# Patient Record
Sex: FEMALE | Race: WHITE | NOT HISPANIC OR LATINO | URBAN - METROPOLITAN AREA
[De-identification: names, ages, dates, MRNs, and addresses within clinical notes are randomized per-mention and may not be internally consistent; named-entity substitution may affect disease eponyms.]

---

## 2017-07-31 ENCOUNTER — AMBULATORY - HEALTHEAST (OUTPATIENT)
Dept: ADMINISTRATIVE | Facility: CLINIC | Age: 82
End: 2017-07-31

## 2017-07-31 RX ORDER — FUROSEMIDE 20 MG
20 TABLET ORAL DAILY
Status: SHIPPED | COMMUNITY
Start: 2017-07-31

## 2017-07-31 RX ORDER — ACETAMINOPHEN 500 MG
1000 TABLET ORAL 3 TIMES DAILY
Status: SHIPPED | COMMUNITY
Start: 2017-07-31

## 2017-07-31 RX ORDER — METOPROLOL SUCCINATE 25 MG/1
50 TABLET, EXTENDED RELEASE ORAL DAILY
Status: SHIPPED | COMMUNITY
Start: 2017-07-31

## 2017-07-31 RX ORDER — AMOXICILLIN 250 MG
1 CAPSULE ORAL 2 TIMES DAILY
Status: SHIPPED | COMMUNITY
Start: 2017-07-31

## 2017-07-31 RX ORDER — HYDRALAZINE HYDROCHLORIDE 25 MG/1
25 TABLET, FILM COATED ORAL 2 TIMES DAILY
Status: SHIPPED | COMMUNITY
Start: 2017-07-31

## 2017-07-31 RX ORDER — ASPIRIN 325 MG
325 TABLET ORAL DAILY
Status: SHIPPED | COMMUNITY
Start: 2017-07-31

## 2017-07-31 RX ORDER — ISOSORBIDE MONONITRATE 30 MG/1
30 TABLET, EXTENDED RELEASE ORAL DAILY
Status: SHIPPED | COMMUNITY
Start: 2017-07-31

## 2017-07-31 RX ORDER — LANSOPRAZOLE 30 MG/1
30 CAPSULE, DELAYED RELEASE ORAL
Status: SHIPPED | COMMUNITY
Start: 2017-07-31

## 2017-07-31 RX ORDER — NITROGLYCERIN 0.4 MG/1
0.4 TABLET SUBLINGUAL EVERY 5 MIN PRN
Status: SHIPPED | COMMUNITY
Start: 2017-07-31

## 2017-08-01 ENCOUNTER — OFFICE VISIT - HEALTHEAST (OUTPATIENT)
Dept: GERIATRICS | Facility: CLINIC | Age: 82
End: 2017-08-01

## 2017-08-01 DIAGNOSIS — R33.9 URINARY RETENTION: ICD-10-CM

## 2017-08-01 DIAGNOSIS — I48.91 ATRIAL FIBRILLATION (H): ICD-10-CM

## 2017-08-01 DIAGNOSIS — I50.30 DIASTOLIC CONGESTIVE HEART FAILURE (H): ICD-10-CM

## 2017-08-01 DIAGNOSIS — I63.9 ACUTE CVA (CEREBROVASCULAR ACCIDENT) (H): ICD-10-CM

## 2017-08-01 DIAGNOSIS — F03.90 DEMENTIA (H): ICD-10-CM

## 2017-08-01 DIAGNOSIS — I60.9 SUBARACHNOID HEMORRHAGE (H): ICD-10-CM

## 2017-08-04 ENCOUNTER — OFFICE VISIT - HEALTHEAST (OUTPATIENT)
Dept: GERIATRICS | Facility: CLINIC | Age: 82
End: 2017-08-04

## 2017-08-04 DIAGNOSIS — I25.10 CAD (CORONARY ARTERY DISEASE): ICD-10-CM

## 2017-08-04 DIAGNOSIS — I48.91 AFIB (H): ICD-10-CM

## 2017-08-04 DIAGNOSIS — I63.9 CVA (CEREBRAL VASCULAR ACCIDENT) (H): ICD-10-CM

## 2017-08-04 DIAGNOSIS — K59.00 CONSTIPATION: ICD-10-CM

## 2017-08-04 DIAGNOSIS — G81.94 LEFT HEMIPARESIS (H): ICD-10-CM

## 2017-08-04 DIAGNOSIS — I10 HYPERTENSION: ICD-10-CM

## 2017-08-04 DIAGNOSIS — R13.10 DYSPHAGIA: ICD-10-CM

## 2017-08-04 DIAGNOSIS — R47.1 DYSARTHRIA: ICD-10-CM

## 2017-08-04 DIAGNOSIS — K21.9 GERD (GASTROESOPHAGEAL REFLUX DISEASE): ICD-10-CM

## 2017-08-04 DIAGNOSIS — F03.90 DEMENTIA (H): ICD-10-CM

## 2017-08-04 DIAGNOSIS — I50.9 CHF (CONGESTIVE HEART FAILURE) (H): ICD-10-CM

## 2017-08-04 DIAGNOSIS — R33.9 URINARY RETENTION: ICD-10-CM

## 2017-08-10 ENCOUNTER — AMBULATORY - HEALTHEAST (OUTPATIENT)
Dept: ADMINISTRATIVE | Facility: CLINIC | Age: 82
End: 2017-08-10

## 2017-08-11 ENCOUNTER — OFFICE VISIT - HEALTHEAST (OUTPATIENT)
Dept: GERIATRICS | Facility: CLINIC | Age: 82
End: 2017-08-11

## 2017-08-11 DIAGNOSIS — R33.9 URINARY RETENTION: ICD-10-CM

## 2017-08-11 DIAGNOSIS — I60.9 SAH (SUBARACHNOID HEMORRHAGE) (H): ICD-10-CM

## 2017-08-11 DIAGNOSIS — I74.2 EMBOLISM AND THROMBOSIS OF ARTERY OF UPPER EXTREMITY (H): ICD-10-CM

## 2017-08-11 DIAGNOSIS — I10 HYPERTENSION: ICD-10-CM

## 2017-08-11 DIAGNOSIS — I48.20 CHRONIC ATRIAL FIBRILLATION (H): ICD-10-CM

## 2017-08-11 DIAGNOSIS — I63.9 CVA (CEREBRAL VASCULAR ACCIDENT) (H): ICD-10-CM
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
## 2017-08-11 DIAGNOSIS — K22.2 ESOPHAGEAL STRICTURE: ICD-10-CM

## 2017-08-18 ENCOUNTER — OFFICE VISIT - HEALTHEAST (OUTPATIENT)
Dept: GERIATRICS | Facility: CLINIC | Age: 82
End: 2017-08-18

## 2017-08-18 DIAGNOSIS — I70.208 BRACHIAL ARTERY OCCLUSION, RIGHT (H): ICD-10-CM

## 2017-08-18 DIAGNOSIS — Z98.890 HISTORY OF EMBOLECTOMY: ICD-10-CM

## 2017-08-18 DIAGNOSIS — I10 HYPERTENSION: ICD-10-CM

## 2017-08-18 DIAGNOSIS — R13.10 DYSPHAGIA: ICD-10-CM

## 2017-08-18 DIAGNOSIS — R52 PAIN: ICD-10-CM

## 2017-08-18 DIAGNOSIS — I50.9 CHF (CONGESTIVE HEART FAILURE) (H): ICD-10-CM

## 2017-08-18 DIAGNOSIS — R33.9 URINARY RETENTION: ICD-10-CM

## 2017-08-18 DIAGNOSIS — I63.9 CVA (CEREBRAL VASCULAR ACCIDENT) (H): ICD-10-CM

## 2017-08-22 ENCOUNTER — OFFICE VISIT - HEALTHEAST (OUTPATIENT)
Dept: GERIATRICS | Facility: CLINIC | Age: 82
End: 2017-08-22

## 2017-08-22 DIAGNOSIS — G81.94 LEFT HEMIPARESIS (H): ICD-10-CM

## 2017-08-22 DIAGNOSIS — I10 HYPERTENSION: ICD-10-CM

## 2017-08-22 DIAGNOSIS — M1A.9XX1 GOUTY TOPHI OF JOINT: ICD-10-CM

## 2017-08-22 DIAGNOSIS — I70.208 BRACHIAL ARTERY OCCLUSION, RIGHT (H): ICD-10-CM

## 2017-08-24 ENCOUNTER — AMBULATORY - HEALTHEAST (OUTPATIENT)
Dept: ADMINISTRATIVE | Facility: CLINIC | Age: 82
End: 2017-08-24

## 2017-08-24 RX ORDER — ALLOPURINOL 100 MG/1
100 TABLET ORAL DAILY
Status: SHIPPED | COMMUNITY
Start: 2017-08-24

## 2017-08-24 RX ORDER — LIDOCAINE 50 MG/G
1 OINTMENT TOPICAL 3 TIMES DAILY PRN
Status: SHIPPED | COMMUNITY
Start: 2017-08-24

## 2017-08-25 ENCOUNTER — OFFICE VISIT - HEALTHEAST (OUTPATIENT)
Dept: GERIATRICS | Facility: CLINIC | Age: 82
End: 2017-08-25

## 2017-08-25 DIAGNOSIS — G81.94 LEFT HEMIPARESIS (H): ICD-10-CM

## 2017-08-25 DIAGNOSIS — I63.9 CVA (CEREBRAL VASCULAR ACCIDENT) (H): ICD-10-CM

## 2017-08-25 DIAGNOSIS — I10 HYPERTENSION: ICD-10-CM

## 2017-08-25 DIAGNOSIS — M1A.9XX1 GOUTY TOPHI OF JOINT: ICD-10-CM

## 2017-08-29 ENCOUNTER — OFFICE VISIT - HEALTHEAST (OUTPATIENT)
Dept: GERIATRICS | Facility: CLINIC | Age: 82
End: 2017-08-29

## 2017-08-29 DIAGNOSIS — I63.9 CVA (CEREBRAL VASCULAR ACCIDENT) (H): ICD-10-CM

## 2017-08-29 DIAGNOSIS — R13.10 DYSPHAGIA: ICD-10-CM

## 2017-08-29 DIAGNOSIS — I70.208 BRACHIAL ARTERY OCCLUSION, RIGHT (H): ICD-10-CM

## 2017-08-29 DIAGNOSIS — G81.94 LEFT HEMIPARESIS (H): ICD-10-CM

## 2017-09-08 ENCOUNTER — OFFICE VISIT - HEALTHEAST (OUTPATIENT)
Dept: GERIATRICS | Facility: CLINIC | Age: 82
End: 2017-09-08

## 2017-09-08 DIAGNOSIS — I10 HYPERTENSION: ICD-10-CM

## 2017-09-08 DIAGNOSIS — R13.10 DYSPHAGIA: ICD-10-CM

## 2017-09-08 DIAGNOSIS — R33.9 URINARY RETENTION: ICD-10-CM

## 2017-09-08 DIAGNOSIS — M70.60 TROCHANTERIC BURSITIS: ICD-10-CM

## 2017-09-12 ENCOUNTER — OFFICE VISIT - HEALTHEAST (OUTPATIENT)
Dept: GERIATRICS | Facility: CLINIC | Age: 82
End: 2017-09-12

## 2017-09-12 DIAGNOSIS — R33.9 URINARY RETENTION: ICD-10-CM

## 2017-09-12 DIAGNOSIS — M25.559 HIP PAIN: ICD-10-CM

## 2017-09-12 DIAGNOSIS — G81.94 LEFT HEMIPARESIS (H): ICD-10-CM

## 2017-09-12 DIAGNOSIS — M70.60 TROCHANTERIC BURSITIS: ICD-10-CM

## 2017-09-15 ENCOUNTER — OFFICE VISIT - HEALTHEAST (OUTPATIENT)
Dept: GERIATRICS | Facility: CLINIC | Age: 82
End: 2017-09-15

## 2017-09-15 DIAGNOSIS — I50.9 CHF (CONGESTIVE HEART FAILURE) (H): ICD-10-CM

## 2017-09-15 DIAGNOSIS — M1A.9XX1 GOUTY TOPHI OF JOINT: ICD-10-CM

## 2017-09-15 DIAGNOSIS — M70.60 TROCHANTERIC BURSITIS: ICD-10-CM

## 2017-09-15 DIAGNOSIS — R33.9 URINARY RETENTION: ICD-10-CM

## 2017-09-19 ENCOUNTER — OFFICE VISIT - HEALTHEAST (OUTPATIENT)
Dept: GERIATRICS | Facility: CLINIC | Age: 82
End: 2017-09-19

## 2017-09-19 DIAGNOSIS — I10 HYPERTENSION: ICD-10-CM

## 2017-09-19 DIAGNOSIS — M70.60 TROCHANTERIC BURSITIS: ICD-10-CM

## 2017-09-19 DIAGNOSIS — G81.94 LEFT HEMIPARESIS (H): ICD-10-CM

## 2017-09-19 DIAGNOSIS — R33.9 URINARY RETENTION: ICD-10-CM

## 2017-09-23 ENCOUNTER — OFFICE VISIT - HEALTHEAST (OUTPATIENT)
Dept: GERIATRICS | Facility: CLINIC | Age: 82
End: 2017-09-23

## 2017-09-23 DIAGNOSIS — I63.9 CVA (CEREBRAL VASCULAR ACCIDENT) (H): ICD-10-CM

## 2017-09-23 DIAGNOSIS — I50.9 CHF (CONGESTIVE HEART FAILURE) (H): ICD-10-CM

## 2017-09-23 DIAGNOSIS — R47.1 DYSARTHRIA: ICD-10-CM

## 2017-09-23 DIAGNOSIS — G81.94 LEFT HEMIPARESIS (H): ICD-10-CM

## 2017-09-26 ENCOUNTER — OFFICE VISIT - HEALTHEAST (OUTPATIENT)
Dept: GERIATRICS | Facility: CLINIC | Age: 82
End: 2017-09-26

## 2017-09-26 DIAGNOSIS — I10 HYPERTENSION: ICD-10-CM

## 2017-09-26 DIAGNOSIS — I63.9 CVA (CEREBRAL VASCULAR ACCIDENT) (H): ICD-10-CM

## 2017-09-26 DIAGNOSIS — I50.9 CHF (CONGESTIVE HEART FAILURE) (H): ICD-10-CM

## 2017-09-26 DIAGNOSIS — K21.9 GERD (GASTROESOPHAGEAL REFLUX DISEASE): ICD-10-CM

## 2017-10-02 ENCOUNTER — AMBULATORY - HEALTHEAST (OUTPATIENT)
Dept: GERIATRICS | Facility: CLINIC | Age: 82
End: 2017-10-02

## 2021-06-12 NOTE — PROGRESS NOTES
[facility]      Facility:    [facility] [[id number]]  Code Status: UNKNOWN      Chief Complaint/Reason for Visit:  Chief Complaint   Patient presents with     H & P     Acute right occipital CVA, recent subarachnoid hemorrhage on Coumadin, coronary artery disease, hypertension, chronic diastolic congestive heart failure, chronic atrial fibrillation, dementia, urinary retention, constipation, leukocytosis.       HPI:   Lien is a 86 y.o. female who who was recently admitted to the hospital on 7/24/2017.  She does have a history of atrial fibrillation coronary artery disease and did have a recent subarachnoid hemorrhage after a fall while she was on Coumadin.  At that time Coumadin was held and she was home and doing well.  She went to rehab then developed left visual field cut deficit and dysarthria and found to have acute CVA.  She was found to have acute CVA right occipital and neurologic involvement was done.  Echocardiogram showed atrial fibrillation with normal ejection fraction and she had severe biatrial enlargement.  She was started on aspirin but not on Coumadin and she underwent PT OT and speech therapy.  She was also seen by physical medicine and rehab indicated patient was not a good candidate for rehab and was sent to the TCU here at [facility].  She does have acute diastolic congestive heart failure with increased shortness of breath and concern for fluid overload.  Chest x-ray for was intermediate and she remained on oxygen.  They did stop her fluids and increase Lasix back to 40 mg and she does have chronic atrial fibrillation and not on Coumadin.  They did mention to consider possibly starting DOA C this week and patient was transferred here to the TCU in stable condition.  She did have urinary retention and she does have a Barkley catheter and in which was continued and she does have dysphagia which she is going to be followed by speech therapy.  Her  leukocytosis in the hospital did resolve.    Patient claims to be short of breath she is not improved since the hospital and her O2 sats are 98% on 2 L with respiratory rate of 20.  Her blood pressure is stable and she is afebrile.  She has not had a bowel movement in about 3 days and feels like she has abdominal pain that is worsening.  It is very difficult with her speaking secondary to her shortness of breath and she has had a history of subarachnoid hemorrhage as well as right occipital stroke.    Past Medical History:  Past Medical History:   Diagnosis Date     Acute CVA (cerebrovascular accident)      CAD (coronary artery disease)      Chronic a-fib      Chronic diastolic CHF (congestive heart failure)      Dementia      Dysphagia      Emphysema, unspecified      HTN (hypertension)      Leukocytosis      Post-polio syndrome      SAH (subarachnoid hemorrhage)      Urinary retention            Surgical History:  History reviewed. No pertinent surgical history.    Family History:   Family History   Problem Relation Age of Onset     Cardiovascular Mother      Cardiovascular Father        Social History:    Social History     Social History     Marital status: N/A     Spouse name: N/A     Number of children: N/A     Years of education: N/A     Social History Main Topics     Smoking status: Current Some Day Smoker     Types: Cigarettes     Smokeless tobacco: None     Alcohol use 0.6 oz/week     1 Glasses of wine per week     Drug use: None     Sexual activity: Not Asked     Other Topics Concern     None     Social History Narrative          Review of Systems   Constitutional:        Positive for shortness of breath, abdominal pain with no bowel movement in 4 days, patient feels overall in decline and frustration with no improvement.  She denies any fevers chills vomiting she does have some occasional nausea and there is no chest pain or tightness.  She can move all 4 extremities however does not feel she is  improving.       Vitals:    08/01/17 0850   BP: 142/64   Pulse: 72   Resp: 20   Temp: 97.2  F (36.2  C)   SpO2: 98%       Physical Exam   Constitutional:   Patient is sitting up in chair.  She is speaking very softly with respiratory rate of 20 with O2 sat of 98% on 2 L.  She seems like she cannot take a deep breath at this time and does have difficulty talking.   HENT:   Head: Normocephalic.   Eyes: Right eye exhibits no discharge. Left eye exhibits no discharge. No scleral icterus.   Neck: No thyromegaly present.   Cardiovascular:   Patient's heart sounds are irregularly irregular with adequate rate control.   Pulmonary/Chest:   Patient exhibits poor inspiratory volume with some crackles at the bases bilateral.  Diminished breath sounds are noted bilateral.   Abdominal: She exhibits distension. There is tenderness. There is no rebound and no guarding.   Musculoskeletal: She exhibits edema.   Neurological: She is alert. She exhibits abnormal muscle tone.   Generalized weakness throughout.  Left visual cut noted.   Skin: Skin is warm and dry.   Psychiatric: She has a normal mood and affect. Her behavior is normal.       Medication List:  Current Outpatient Prescriptions   Medication Sig     acetaminophen (TYLENOL) 500 MG tablet Take 1,000 mg by mouth 3 (three) times a day.     aspirin 325 MG tablet Take 325 mg by mouth daily.     calcium carbonate-vitamin D3 (CALTRATE 600 PLUS D3) 600 mg(1,500mg) -400 unit per tablet Take 1 tablet by mouth daily.     cholecalciferol, vitamin D3, 1,000 unit tablet Take 1,000 Units by mouth daily.     docusate sodium (COLACE) 100 MG capsule Take 100 mg by mouth 2 (two) times a day as needed for constipation.     famotidine (PEPCID) 20 MG tablet Take 40 mg by mouth at bedtime as needed for heartburn.     furosemide (LASIX) 20 MG tablet Take 40 mg by mouth daily.     hydrALAZINE (APRESOLINE) 25 MG tablet Take 25 mg by mouth 3 (three) times a day.     isosorbide mononitrate (IMDUR) 30 MG  24 hr tablet Take 30 mg by mouth daily.     lansoprazole (PREVACID) 30 MG capsule Take 30 mg by mouth 2 (two) times a day before meals.     menthol (BIOFREEZE, MENTHOL,) 4 % Gel Apply 1 application topically 2 (two) times a day as needed.     metoprolol succinate (TOPROL-XL) 25 MG Take 25 mg by mouth daily.     nitroglycerin (NITROSTAT) 0.4 MG SL tablet Place 0.4 mg under the tongue every 5 (five) minutes as needed for chest pain.     oxyCODONE (ROXICODONE) 5 MG immediate release tablet Take 5 mg by mouth every 6 (six) hours as needed for pain.     polyethylene glycol (MIRALAX) 17 gram packet Take 17 g by mouth daily.     senna-docusate (SENNOSIDES-DOCUSATE SODIUM) 8.6-50 mg tablet Take 1 tablet by mouth 2 (two) times a day.     senna-docusate (SENNOSIDES-DOCUSATE SODIUM) 8.6-50 mg tablet Take 1 tablet by mouth 2 (two) times a day as needed for constipation.     simethicone (MYLICON) 125 MG chewable tablet Chew 125 mg daily.     simvastatin (ZOCOR) 40 MG tablet Take 40 mg by mouth at bedtime.       Labs: EKG in the hospital showed rate of 67 with no ST/T changes.  CTA of the neck showed no significant internal carotid artery stenosis largely occluded left vertebral artery and normal right vertebral artery.  Head CT stable moderate generalized atrophy and moderate to severe presumed chronic small vessel ischemic changes.      Assessment:    ICD-10-CM    1. Acute CVA (cerebrovascular accident) I63.9    2. Subarachnoid hemorrhage I60.9    3. Diastolic congestive heart failure I50.30    4. Dementia F03.90    5. Atrial fibrillation I48.91    6. Urinary retention R33.9        Plan: Plan at this time is to get a chest x-ray PA and lateral stat today secondary to shortness of breath and continue to monitor her respiratory status closely.  Speech therapy will evaluate and treat and abdominal film will be done flat and upright stat for constipation.  Dulcolax suppository today stat and a basic metabolic profile and CBC be  done stat today secondary to shortness of breath.  I cannot rule out pneumonia at this time so oxygen will be given to keep sats greater than 90.  I will continue to monitor above medical problems and no other changes to care plan at this time.  Only catheter will remain in at this time and patient will possibly go back to the hospital if no improvement is done.        Electronically signed by: Dedrick Rodrigues,

## 2021-06-12 NOTE — PROGRESS NOTES
[facility]    Facility:   [facility] [[id number]]   Code Status: DNR      CHIEF COMPLAINT/REASON FOR VISIT:  Chief Complaint   Patient presents with     Follow Up     cva, 02,        HISTORY:      HPI: Lien is a 86 y.o. female who I was able to visit with today secondary to hospitalization July 24 - July 29, 2017 secondary to her recent subarachnoid hemorrhage after a fall while being on Coumadin.  She went to cardiovascular for further rehab and developed left visual field cut deficit and dysarthria was found to have acute CVA/occipital.  Neurology was involved.  Echo with A. fib normal ejection fraction.  Does have severe biatrial enlargement.  Aspirin was started.  Therapies were also initiated.  The recent subarachnoid hemorrhage was stable and monitored.  Does have a history of CAD and hypertension currently being treated with metoprolol and Imdur along with a statin.  Has a history of chronic congestive diastolic failure does have shortness of breath the chest x-ray was indeterminate.  Currently on furosemide.  Also currently on oxygen.  Does have an A. fib is chronic not on Coumadin due to subarachnoid hemorrhage.  She does have urinary retention does have a Barkley catheter.  Does have dysphagia along with a poor appetite.  Was seen by gastroenterology and has had a esophageal stricture dilated and gastritis.  Also being treated for constipation along with a resolved leukocytosis.  Had a chance to address all these particular concerns.  Recently did have a abdominal x-ray which did show constipation also recently a chest x-ray which was negative.  She did have laboratory studies including a BMP and hemoglobin on August 1.  For pain she is on Tylenol thousand milligrams 3 times a day oxycodone as needed 5 mg is only needed 1 dose.  Needs a full assist with staff.  For heartburn or reflux currently on Prevacid.  Does have left-sided weakness.    Past Medical History:    Diagnosis Date     Acute CVA (cerebrovascular accident)      CAD (coronary artery disease)      Chronic a-fib      Chronic diastolic CHF (congestive heart failure)      Dementia      Dysphagia      Emphysema, unspecified      HTN (hypertension)      Leukocytosis      Post-polio syndrome      SAH (subarachnoid hemorrhage)      Urinary retention              Family History   Problem Relation Age of Onset     Cardiovascular Mother      Cardiovascular Father      Social History     Social History     Marital status:      Spouse name: N/A     Number of children: N/A     Years of education: N/A     Social History Main Topics     Smoking status: Current Some Day Smoker     Types: Cigarettes     Smokeless tobacco: Not on file     Alcohol use 0.6 oz/week     1 Glasses of wine per week     Drug use: Not on file     Sexual activity: Not on file     Other Topics Concern     Not on file     Social History Narrative         Review of Systems  Currently no reports of fever chills fatigue cough or cold flulike symptoms rashes sores or stiff neck.  Recently did have an acute CVA with right occipital does have dysarthria and dysphagia.  A history of CAD CHF recent subarachnoid hemorrhage A. fib hypertension urinary retention    Current Outpatient Prescriptions   Medication Sig     acetaminophen (TYLENOL) 500 MG tablet Take 1,000 mg by mouth 3 (three) times a day.     aspirin 325 MG tablet Take 325 mg by mouth daily.     calcium carbonate-vitamin D3 (CALTRATE 600 PLUS D3) 600 mg(1,500mg) -400 unit per tablet Take 1 tablet by mouth daily.     cholecalciferol, vitamin D3, 1,000 unit tablet Take 1,000 Units by mouth daily.     docusate sodium (COLACE) 100 MG capsule Take 100 mg by mouth 2 (two) times a day as needed for constipation.     famotidine (PEPCID) 20 MG tablet Take 40 mg by mouth at bedtime as needed for heartburn.     furosemide (LASIX) 20 MG tablet Take 40 mg by mouth daily.     hydrALAZINE (APRESOLINE) 25 MG tablet  Take 25 mg by mouth 3 (three) times a day.     isosorbide mononitrate (IMDUR) 30 MG 24 hr tablet Take 30 mg by mouth daily.     lansoprazole (PREVACID) 30 MG capsule Take 30 mg by mouth 2 (two) times a day before meals.     menthol (BIOFREEZE, MENTHOL,) 4 % Gel Apply 1 application topically 2 (two) times a day as needed.     metoprolol succinate (TOPROL-XL) 25 MG Take 25 mg by mouth daily.     nitroglycerin (NITROSTAT) 0.4 MG SL tablet Place 0.4 mg under the tongue every 5 (five) minutes as needed for chest pain.     oxyCODONE (ROXICODONE) 5 MG immediate release tablet Take 5 mg by mouth every 6 (six) hours as needed for pain.     polyethylene glycol (MIRALAX) 17 gram packet Take 17 g by mouth daily.     senna-docusate (SENNOSIDES-DOCUSATE SODIUM) 8.6-50 mg tablet Take 1 tablet by mouth 2 (two) times a day.     senna-docusate (SENNOSIDES-DOCUSATE SODIUM) 8.6-50 mg tablet Take 1 tablet by mouth 2 (two) times a day as needed for constipation.     simethicone (MYLICON) 125 MG chewable tablet Chew 125 mg daily.     simvastatin (ZOCOR) 40 MG tablet Take 40 mg by mouth at bedtime.       .There were no vitals filed for this visit.  Blood pressure 100/61 pulse 66 respirations 18 temperature 97.0  Physical Exam   Constitutional: No distress.   HENT:   Head: Normocephalic.   Eyes: Pupils are equal, round, and reactive to light.   Neck: Neck supple. No thyromegaly present.   Cardiovascular:   Irregularity.  No lower extremity edema.   Pulmonary/Chest: Breath sounds normal.   Dysphagia.   Abdominal: Bowel sounds are normal. There is no tenderness. There is no guarding.   Genitourinary:   Genitourinary Comments: Urinary retention.  Barkley catheter.   Musculoskeletal:   Left hemiparesis.  History of CVA right/occipital.   Lymphadenopathy:     She has no cervical adenopathy.   Neurological: She is alert.   Dysarthria.   Skin: Skin is warm and dry. No rash noted.         LABS:   Lab Results   Component Value Date    WBC 6.7  08/01/2017    HGB 12.0 08/01/2017    HCT 38.5 08/01/2017    MCV 96 08/01/2017     08/01/2017     Results for orders placed or performed in visit on 08/01/17   Basic Metabolic Panel   Result Value Ref Range    Sodium 143 136 - 145 mmol/L    Potassium 3.8 3.5 - 5.0 mmol/L    Chloride 98 98 - 107 mmol/L    CO2 34 (H) 22 - 31 mmol/L    Anion Gap, Calculation 11 5 - 18 mmol/L    Glucose 112 70 - 125 mg/dL    Calcium 10.0 8.5 - 10.5 mg/dL    BUN 11 8 - 28 mg/dL    Creatinine 0.76 0.60 - 1.10 mg/dL    GFR MDRD Af Amer >60 >60 mL/min/1.73m2    GFR MDRD Non Af Amer >60 >60 mL/min/1.73m2           ASSESSMENT:      ICD-10-CM    1. CVA (cerebral vascular accident) I63.9    2. Dysphagia R13.10    3. Dysarthria R47.1    4. Urinary retention R33.9    5. Constipation K59.00    6. Afib I48.91    7. CAD (coronary artery disease) I25.10    8. Dementia F03.90    9. GERD (gastroesophageal reflux disease) K21.9    10. Hypertension I10    11. Left hemiparesis G81.94    12. CHF (congestive heart failure) I50.9        PLAN:    Went over her recent laboratory studies.  Went over her hospitalization.  Continue to manage and follow.  Again recently a chest x-ray which was negative.  Also did have a belly x-ray which did show constipation.  Is still on oxygen.  Barkley catheter.  Her pain seems to be well managed.  Does need assistance with all ADLs.        Electronically signed by: Michael Duane Johnson, CNP

## 2021-06-12 NOTE — PROGRESS NOTES
[facility]      Facility:    [facility]SW[facility] ROOMING ACTIVITY QUESTION    Code Status: DNR      Chief Complaint/Reason for Visit:  Chief Complaint   Patient presents with     H & P     readmisssion after ight arm declotted       HPI:   Lien is a 86 y.o. female who originally was hospitalized July 4 - July 29, 2017 when she had fallen, and suffered a small subarachnoid hemorrhage while on Coumadin therapy.  While there, she developed a left visual field cut deficit, dysarthria, having suffered an acute occipital CVA.  She had biatrial enlargement which was severe, was started on aspirin.  She has a history of coronary artery disease and hypertension treated with metoprolol and Imdur and statin.  She has known history of chronic congestive diastolic failure  She has chronic A. fib no longer on Coumadin after the subarachnoid hemorrhage  She had dilatation esophageal stricture as well as gastritis.  Transferred to [facility] 3 after her stroke for rehabilitation    While at the TCU, she developed acute right upper extremity numbness and paralysis on 8/6/17.  She returned to [facility], was found to have a right brachial artery occlusive embolus, thought to be from left atrial thrombus.  She went to the OR for embolectomy that same day.  She had significant postoperative pain, did not want to take narcotics.  The patient had some difficulty with swallowing but speech language path did a swallow study, she was okayed for regular diet, thin liquids.  She was discharged from [facility] and returned to [facility]    Past Medical History:  Past Medical History:   Diagnosis Date     Acute CVA (cerebrovascular accident)      Arterial occlusion      Artery of extremity, embolism and thrombosis      CAD (coronary artery disease)      Chronic a-fib      Chronic diastolic CHF (congestive heart failure)      Dementia      Dysphagia      Emphysema, unspecified      HTN (hypertension)       Leukocytosis      Post-polio syndrome      SAH (subarachnoid hemorrhage)      Urinary retention            Surgical History:  Past Surgical History:   Procedure Laterality Date     BRACHIAL EMBOLECTOMY Right 08/2006    right upper extremity embolectomy        Family History:   Family History   Problem Relation Age of Onset     Cardiovascular Mother      Cardiovascular Father        Social History:    Social History     Social History     Marital status:      Spouse name: N/A     Number of children: N/A     Years of education: N/A     Social History Main Topics     Smoking status: Current Some Day Smoker     Types: Cigarettes     Smokeless tobacco: Not on file     Alcohol use 0.6 oz/week     1 Glasses of wine per week     Drug use: Not on file     Sexual activity: Not on file     Other Topics Concern     Not on file     Social History Narrative          Review of Systems   She is taking Norco 5/325 mg for her arm it is helpful.  Her arm where this surgical incision is is painful for her.  She has had a lot of hiccups recently  She has some dizziness that comes and go, but it is not vertigo.  She has mild constipation but does not need a change in medications at this point.  She lives in assisted living, and gets quite a few services.  The remainder of the comprehensive review of systems is negative    Blood pressure 100/50, pulse 96, temperature (!) 96.5  F (35.8  C), resp. rate 16, SpO2 97 %.        Physical Exam   Constitutional: She is oriented to person, place, and time. She appears distressed.   HENT:   Right Ear: External ear normal.   Left Ear: External ear normal.   Nose: Nose normal.   Mouth/Throat: Oropharynx is clear and moist.   Eyes: Conjunctivae and EOM are normal. No scleral icterus.   Cardiovascular: Normal heart sounds.    Irregularly irregular   Pulmonary/Chest: Breath sounds normal. She has no rales.   Abdominal: Soft. Bowel sounds are normal. She exhibits no distension. There is no  tenderness.   Lymphadenopathy:     She has no cervical adenopathy.   Neurological: She is alert and oriented to person, place, and time.   Left arm with low muscle bulk, decreased range of motion   Skin: No erythema.   Left arm a bit weak (congenital)  Right upper arm incision has staples and Steri-Strips, with some bloody discharge.  She has a full radial pulse   Psychiatric: She has a normal mood and affect. Her behavior is normal. Thought content normal.       Medication List:  Current Outpatient Prescriptions   Medication Sig     acetaminophen (TYLENOL) 500 MG tablet Take 1,000 mg by mouth 3 (three) times a day.     aspirin 325 MG tablet Take 325 mg by mouth daily.     calcium carbonate-vitamin D3 (CALTRATE 600 PLUS D3) 600 mg(1,500mg) -400 unit per tablet Take 1 tablet by mouth daily.     cholecalciferol, vitamin D3, 1,000 unit tablet Take 1,000 Units by mouth daily.     docusate sodium (COLACE) 100 MG capsule Take 100 mg by mouth 2 (two) times a day as needed for constipation.     enoxaparin (LOVENOX) 40 mg/0.4 mL syringe Inject 40 mg under the skin every 12 (twelve) hours.     famotidine (PEPCID) 20 MG tablet Take 40 mg by mouth at bedtime as needed for heartburn.     furosemide (LASIX) 20 MG tablet Take 40 mg by mouth daily.     hydrALAZINE (APRESOLINE) 25 MG tablet Take 25 mg by mouth 3 (three) times a day.     HYDROcodone-acetaminophen 5-325 mg per tablet Take 1-2 tablets by mouth every 6 (six) hours as needed for pain.     isosorbide mononitrate (IMDUR) 30 MG 24 hr tablet Take 30 mg by mouth daily.     lansoprazole (PREVACID) 30 MG capsule Take 30 mg by mouth 2 (two) times a day before meals.     menthol (BIOFREEZE, MENTHOL,) 4 % Gel Apply 1 application topically 2 (two) times a day as needed.     metoprolol succinate (TOPROL-XL) 25 MG Take 25 mg by mouth daily.     nitroglycerin (NITROSTAT) 0.4 MG SL tablet Place 0.4 mg under the tongue every 5 (five) minutes as needed for chest pain.     polyethylene  glycol (MIRALAX) 17 gram packet Take 17 g by mouth daily.     senna-docusate (SENNOSIDES-DOCUSATE SODIUM) 8.6-50 mg tablet Take 2 tablets by mouth 2 (two) times a day.      simethicone (MYLICON) 125 MG chewable tablet Chew 125 mg daily.     WARFARIN SODIUM (WARFARIN ORAL) Take by mouth. 2.5 mg daily       Labs: 8/6 admission to [facility]    White count 10.2 hemoglobin 12.0, platelets 332,000  Lactate normal  Sodium 139, potassium 4.0, chloride 101, CO2 28, BUN 14, creatinine 0.71, calcium 9.4  INR 1.0    Assessment:    ICD-10-CM    1. Embolism and thrombosis of artery of upper extremity: right brachial artery I74.2    2. Fall, sequela W19.XXXS    3. SAH (subarachnoid hemorrhage) I60.9    4. CVA (cerebral vascular accident) I63.9    5. Esophageal stricture K22.2    6. Chronic atrial fibrillation I48.2    7. Hypertension I10    8. Urinary retention R33.9          Plan:  Wound cares as ordered  Needs to have evaluation with urology  Had esophageal dilatation, not currently having a problem with dysphagia  Resume therapies      Electronically signed by: Starla Smalls MD

## 2021-06-12 NOTE — PROGRESS NOTES
[facility]    Facility:   [facility] [[id number]]   Code Status: DNR      CHIEF COMPLAINT/REASON FOR VISIT:  Chief Complaint   Patient presents with     Follow Up     rehab,        HISTORY:      HPI: Lien is a 86 y.o. female who I had the pleasure of visiting with today secondary to her hospitalization July 20 - July 29 secondary subarachnoid hemorrhage after a fall was on Coumadin.  Did develop left visual field cut deficit along with dysarthria and also was found to have acute occipital CVA.  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  She did have the staples removed and did see vascular in August 25.  Regarding her dysarthria and her dysphagia no longer working with speech therapy she did pass her swallow test and currently on a regular diet.  She did have a BMP in the 28th see results below.  Getting extra nutrient shakes.  No narcotics pretty much on Tylenol.  Also adjusting her Coumadin alternating 4 and 3 mg rechecking a pro time on September 5.  No heartburn or reflux.  Normotensive.  Had a chance to speak with her in therapy today.    Past Medical History:   Diagnosis Date     Acute CVA (cerebrovascular accident)      Arterial occlusion      Artery of extremity, embolism and thrombosis      Brachial artery occlusion, right      CAD (coronary artery disease)      Chronic a-fib      Chronic diastolic CHF (congestive heart failure)      Dementia      Dysphagia      Emphysema, unspecified      HTN (hypertension)      Leukocytosis      Post-polio syndrome      SAH (subarachnoid hemorrhage)      Urinary retention              Family History   Problem Relation Age of Onset     Cardiovascular Mother      Cardiovascular Father      Social History     Social History     Marital status:      Spouse name: N/A     Number of  children: N/A     Years of education: N/A     Social History Main Topics     Smoking status: Current Some Day Smoker     Types: Cigarettes     Smokeless tobacco: Not on file     Alcohol use 0.6 oz/week     1 Glasses of wine per week     Drug use: Not on file     Sexual activity: Not on file     Other Topics Concern     Not on file     Social History Narrative         Review of Systems  Currently no reports of fever chills fatigue cough or cold flulike symptoms nausea vomiting diarrhea unusual aches or pains rashes or sores.  History of urinary retention with Barkley catheter.  History of right brachial occlusive thrombus status post embolectomy, left hemiparesis GERD hypertension  Current Outpatient Prescriptions   Medication Sig     acetaminophen (TYLENOL) 500 MG tablet Take 1,000 mg by mouth 3 (three) times a day.     allopurinol (ZYLOPRIM) 100 MG tablet Take 100 mg by mouth 2 (two) times a day.     aspirin 325 MG tablet Take 325 mg by mouth daily.     calcium carbonate-vitamin D3 (CALTRATE 600 PLUS D3) 600 mg(1,500mg) -400 unit per tablet Take 1 tablet by mouth daily.     cholecalciferol, vitamin D3, 1,000 unit tablet Take 1,000 Units by mouth daily.     colchicine 0.6 mg tablet Take 0.6 mg by mouth daily.     docusate sodium (COLACE) 100 MG capsule Take 100 mg by mouth 2 (two) times a day as needed for constipation.     famotidine (PEPCID) 20 MG tablet Take 40 mg by mouth at bedtime as needed for heartburn.     furosemide (LASIX) 20 MG tablet Take 20 mg by mouth daily.      hydrALAZINE (APRESOLINE) 25 MG tablet Take 25 mg by mouth 3 (three) times a day.     isosorbide mononitrate (IMDUR) 30 MG 24 hr tablet Take 30 mg by mouth daily.     lansoprazole (PREVACID) 30 MG capsule Take 30 mg by mouth 2 (two) times a day before meals.     lidocaine (XYLOCAINE) 5 % ointment Apply 1 application topically 3 (three) times a day as needed.     menthol (BIOFREEZE, MENTHOL,) 4 % Gel Apply 1 application topically 2 (two) times a  day as needed.     methyl salicylate-menthol Oint Apply 1 application topically 3 (three) times a day as needed.     metoprolol succinate (TOPROL-XL) 25 MG Take 50 mg by mouth daily.      nitroglycerin (NITROSTAT) 0.4 MG SL tablet Place 0.4 mg under the tongue every 5 (five) minutes as needed for chest pain.     polyethylene glycol (MIRALAX) 17 gram packet Take 17 g by mouth daily as needed.      predniSONE (DELTASONE) 5 MG tablet Take 5 mg by mouth daily.     senna-docusate (SENNOSIDES-DOCUSATE SODIUM) 8.6-50 mg tablet Take 1 tablet by mouth 2 (two) times a day.      simethicone (MYLICON) 125 MG chewable tablet Chew 125 mg daily as needed.      WARFARIN SODIUM (WARFARIN ORAL) Take by mouth. 3 mg daily       .There were no vitals filed for this visit.  Blood pressure 117/61 pulse 80 respirations 18 temperature 96.7  Physical Exam    Constitutional: No distress.   HENT:   Head: Normocephalic.   Eyes: Pupils are equal, round, and reactive to light.   Neck: Neck supple. No thyromegaly present.   Cardiovascular:   Irregularity.  No lower extremity edema.   Pulmonary/Chest: Breath sounds normal.   Dysphagia.   Abdominal: Bowel sounds are normal. There is no tenderness. There is no guarding.   Genitourinary:   Genitourinary Comments: Urinary retention.  Barkley catheter.   Musculoskeletal:   Left hemiparesis.  History of CVA right/occipital.   Right arm embolectomy of right brachial and ulnar arteries.  Right middle finger gout tophi  Lymphadenopathy:     She has no cervical adenopathy.   Neurological: She is alert.   Dysarthria.   Skin: Skin is warm and dry. No rash noted.       LABS:   Results for orders placed or performed in visit on 08/28/17   Basic Metabolic Panel   Result Value Ref Range    Sodium 141 136 - 145 mmol/L    Potassium 4.2 3.5 - 5.0 mmol/L    Chloride 103 98 - 107 mmol/L    CO2 28 22 - 31 mmol/L    Anion Gap, Calculation 10 5 - 18 mmol/L    Glucose 101 70 - 125 mg/dL    Calcium 9.7 8.5 - 10.5 mg/dL    BUN  19 8 - 28 mg/dL    Creatinine 0.82 0.60 - 1.10 mg/dL    GFR MDRD Af Amer >60 >60 mL/min/1.73m2    GFR MDRD Non Af Amer >60 >60 mL/min/1.73m2           ASSESSMENT:      ICD-10-CM    1. CVA (cerebral vascular accident) I63.9    2. 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 Left hemiparesis G81.94    4. Dysphagia R13.10        PLAN:    At this time no further changes are necessary.  She will continue to work with therapy.  Once again she did work with speech therapy she now is on a regular diet.  Also now alternating Coumadin 4 and 3 mg rechecking a pro time on September 5.      Electronically signed by: Michael Duane Johnson, CNP

## 2021-06-12 NOTE — PROGRESS NOTES
[facility]    Facility:   [facility] [[id number]]   Code Status: DNR      CHIEF COMPLAINT/REASON FOR VISIT:  Chief Complaint   Patient presents with     Follow Up     weakness, finger.       HISTORY:      HPI: Lien is a 86 y.o. female who I had the pleasure to visit with today secondary to her initial hospitalization July 20 4 July 29th 2017 secondary to subarachnoid hemorrhage after a fall and was on Coumadin.  She also did have a and did develop a left visual field cut deficit dysarthria was found to have acute occipital CVA.  Most recent hospitalization August 14 - August 16, 2017 secondary to a right brachial occlusive embolus.  She did have a status post embolectomy of the right brachial and ulnar arteries on August 6, 2017.  She has not had any erythema warmth or drainage to that area does have an Ace wrap.  She also has a history of chronic urinary retention also had a questionable UTI is finishing up her Vantin on August 26.  She has failed Barkley cath removal.  She should follow-up with urology.  She is on a mechanical soft diet.  Is on a number of medications also recently did develop some right middle finger swelling and it is pretty typical of gout tophi was put on colchicine daily.  Also for pain is on Dilaudid as needed 3-4 doses a day and Tylenol scheduled.  She has been normotensive and afebrile.  She also is on Coumadin she is on antibiotics her level came back today at 4.0 so put on hold for a couple of days.  Getting extra house nutrient supplements.  Does seem to be in pretty good spirits overall.  Does have a history of chronic diastolic heart failure currently has been euvolemic.  Also has a history of chronic atrial fibrillation.  Does have left-sided weakness.  Needs assistance with ADLs.    Past Medical History:   Diagnosis Date     Acute CVA (cerebrovascular accident)      Arterial occlusion      Artery of extremity, embolism and thrombosis      CAD  (coronary artery disease)      Chronic a-fib      Chronic diastolic CHF (congestive heart failure)      Dementia      Dysphagia      Emphysema, unspecified      HTN (hypertension)      Leukocytosis      Post-polio syndrome      SAH (subarachnoid hemorrhage)      Urinary retention              Family History   Problem Relation Age of Onset     Cardiovascular Mother      Cardiovascular Father      Social History     Social History     Marital status:      Spouse name: N/A     Number of children: N/A     Years of education: N/A     Social History Main Topics     Smoking status: Current Some Day Smoker     Types: Cigarettes     Smokeless tobacco: Not on file     Alcohol use 0.6 oz/week     1 Glasses of wine per week     Drug use: Not on file     Sexual activity: Not on file     Other Topics Concern     Not on file     Social History Narrative         Review of Systems  Currently no reports of fever chills fatigue cough or cold flulike symptoms nausea vomiting diarrhea unusual aches or pains rashes or sores.  History of urinary retention with Barkley catheter.  History of right brachial occlusive thrombus status post embolectomy, left hemiparesis GERD hypertension    Current Outpatient Prescriptions   Medication Sig     acetaminophen (TYLENOL) 500 MG tablet Take 1,000 mg by mouth 3 (three) times a day.     aspirin 325 MG tablet Take 325 mg by mouth daily.     calcium carbonate-vitamin D3 (CALTRATE 600 PLUS D3) 600 mg(1,500mg) -400 unit per tablet Take 1 tablet by mouth daily.     cholecalciferol, vitamin D3, 1,000 unit tablet Take 1,000 Units by mouth daily.     docusate sodium (COLACE) 100 MG capsule Take 100 mg by mouth 2 (two) times a day as needed for constipation.     famotidine (PEPCID) 20 MG tablet Take 40 mg by mouth at bedtime as needed for heartburn.     furosemide (LASIX) 20 MG tablet Take 40 mg by mouth daily.     hydrALAZINE (APRESOLINE) 25 MG tablet Take 25 mg by mouth 3 (three) times a day.      isosorbide mononitrate (IMDUR) 30 MG 24 hr tablet Take 30 mg by mouth daily.     lansoprazole (PREVACID) 30 MG capsule Take 30 mg by mouth 2 (two) times a day before meals.     menthol (BIOFREEZE, MENTHOL,) 4 % Gel Apply 1 application topically 2 (two) times a day as needed.     metoprolol succinate (TOPROL-XL) 25 MG Take 25 mg by mouth daily.     nitroglycerin (NITROSTAT) 0.4 MG SL tablet Place 0.4 mg under the tongue every 5 (five) minutes as needed for chest pain.     polyethylene glycol (MIRALAX) 17 gram packet Take 17 g by mouth daily.     senna-docusate (SENNOSIDES-DOCUSATE SODIUM) 8.6-50 mg tablet Take 2 tablets by mouth 2 (two) times a day.      simethicone (MYLICON) 125 MG chewable tablet Chew 125 mg daily.     WARFARIN SODIUM (WARFARIN ORAL) Take by mouth. 2.5 mg daily       .There were no vitals filed for this visit.  Blood pressure 131/87 pulse 75 respirations 20 temperature 96.8  Physical Exam   Constitutional: No distress.   HENT:   Head: Normocephalic.   Eyes: Pupils are equal, round, and reactive to light.   Neck: Neck supple. No thyromegaly present.   Cardiovascular:   Irregularity.  No lower extremity edema.   Pulmonary/Chest: Breath sounds normal.   Dysphagia.   Abdominal: Bowel sounds are normal. There is no tenderness. There is no guarding.   Genitourinary:   Genitourinary Comments: Urinary retention.  Barkley catheter.   Musculoskeletal:   Left hemiparesis.  History of CVA right/occipital.   Right arm embolectomy of right brachial and ulnar arteries.  Right middle finger gout tophi  Lymphadenopathy:     She has no cervical adenopathy.   Neurological: She is alert.   Dysarthria.   Skin: Skin is warm and dry. No rash noted.    LABS:   Lab Results   Component Value Date    WBC 6.7 08/01/2017    HGB 12.0 08/01/2017    HCT 38.5 08/01/2017    MCV 96 08/01/2017     08/01/2017     Results for orders placed or performed in visit on 08/01/17   Basic Metabolic Panel   Result Value Ref Range    Sodium  143 136 - 145 mmol/L    Potassium 3.8 3.5 - 5.0 mmol/L    Chloride 98 98 - 107 mmol/L    CO2 34 (H) 22 - 31 mmol/L    Anion Gap, Calculation 11 5 - 18 mmol/L    Glucose 112 70 - 125 mg/dL    Calcium 10.0 8.5 - 10.5 mg/dL    BUN 11 8 - 28 mg/dL    Creatinine 0.76 0.60 - 1.10 mg/dL    GFR MDRD Af Amer >60 >60 mL/min/1.73m2    GFR MDRD Non Af Amer >60 >60 mL/min/1.73m2       No results found for: COLORU, CLARITYU, GLUCOSEU, BILIRUBINUR, KETONESU, SPECGRAV, HGBUA, PHUR, PROTEINUA, UROBILINOGEN, NITRITE, LEUKOCYTESUR, BACTERIA, RBCUA, WBCUA, SQUAMEPI, TRANSEPI      ASSESSMENT:      ICD-10-CM    1. 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 Left hemiparesis G81.94    3. Hypertension I10    4. Gouty tophi of joint M1A.00X1        PLAN:    Apparently had a UTI while in the hospital finish up her Vantin August 26.  Continue with the colchicine secondary to her right middle finger tophi gout.  Continue with current medications and treatments.  Also is on Coumadin put on hold for INR today of 4.0 but again is on Vantin we will recheck the pro time again in 2 more days.  Continue with mechanical soft diet.  Ace wrap to the right arm.      Electronically signed by: Michael Duane Johnson, CNP

## 2021-06-12 NOTE — PROGRESS NOTES
[facility]    Facility:   [facility] [[id number]]   Code Status: DNR      CHIEF COMPLAINT/REASON FOR VISIT:  Chief Complaint   Patient presents with     Problem Visit     injection       HISTORY:      HPI: Lien is a 86 y.o. female who I the pleasure of visiting with plus had the opportunity to visit secondary to injecting her bilateral trochanteric bursa.    She was initially hospitalized July 20 - July 29 secondary to subarachnoid hemorrhage after a fall.  Was on Coumadin.  Did develop a left visual field cut along with dysphasia and dysarthria.  Was hospitalized again August 14 - August 16, 2017 secondary to right brachial occlusive embolus status post embolectomy of the right brachial and ulnar arteries.  She was having some bilateral hip pain left greater than right and 2 are examined through our conversation at the therapy she was having some trochanteric bursa inflammation which has been limiting her ambulation process so we did talk about the injections last week and again today and she like to proceed and so therefore after palpating each trochanteric bursa and doing a an examination the area was prepped and then injected with a total of both hips 40 mg Kenalog mixed with 2% Xylocaine.  Half-and-half in each hip.  The area again was prepped bandaged.  She was able to tolerate the procedure well I did see her about a half hour later and really no problems.  She also is on warfarin we had to adjust her Coumadin currently on 4 and 3 mg alternating next pro time is September 19.  She also has urinary retention and due to therapy as well as doing some transfers we will now discontinue the Barkley catheter and hopefully she can keep that thing removed but we will do PVRs the next couple of days.  She otherwise has been normotensive.  Her blood pressures did improve we did decrease her hydralazine 25 mg twice daily rather than 3 times daily and the blood pressures did  improve  Since they were almost hypotensive.  Swallowing well.    Past Medical History:   Diagnosis Date     Acute CVA (cerebrovascular accident)      Arterial occlusion      Artery of extremity, embolism and thrombosis      Brachial artery occlusion, right      CAD (coronary artery disease)      Chronic a-fib      Chronic diastolic CHF (congestive heart failure)      Dementia      Dysphagia      Emphysema, unspecified      HTN (hypertension)      Leukocytosis      Post-polio syndrome      SAH (subarachnoid hemorrhage)      Urinary retention              Family History   Problem Relation Age of Onset     Cardiovascular Mother      Cardiovascular Father      Social History     Social History     Marital status:      Spouse name: N/A     Number of children: N/A     Years of education: N/A     Social History Main Topics     Smoking status: Current Some Day Smoker     Types: Cigarettes     Smokeless tobacco: Not on file     Alcohol use 0.6 oz/week     1 Glasses of wine per week     Drug use: Not on file     Sexual activity: Not on file     Other Topics Concern     Not on file     Social History Narrative         Review of Systems  Currently no reports of fever chills fatigue cough or cold flulike symptoms nausea vomiting diarrhea unusual aches or pains rashes or sores.  History of urinary retention with Barkley catheter.  History of right brachial occlusive thrombus status post embolectomy, left hemiparesis GERD hypertension    Current Outpatient Prescriptions   Medication Sig     acetaminophen (TYLENOL) 500 MG tablet Take 1,000 mg by mouth 3 (three) times a day.     allopurinol (ZYLOPRIM) 100 MG tablet Take 100 mg by mouth 2 (two) times a day.     aspirin 325 MG tablet Take 325 mg by mouth daily.     calcium carbonate-vitamin D3 (CALTRATE 600 PLUS D3) 600 mg(1,500mg) -400 unit per tablet Take 1 tablet by mouth daily.     cholecalciferol, vitamin D3, 1,000 unit tablet Take 1,000 Units by mouth daily.     colchicine  0.6 mg tablet Take 0.6 mg by mouth daily.     docusate sodium (COLACE) 100 MG capsule Take 100 mg by mouth 2 (two) times a day as needed for constipation.     famotidine (PEPCID) 20 MG tablet Take 40 mg by mouth at bedtime as needed for heartburn.     furosemide (LASIX) 20 MG tablet Take 20 mg by mouth daily.      hydrALAZINE (APRESOLINE) 25 MG tablet Take 25 mg by mouth 2 (two) times a day.      isosorbide mononitrate (IMDUR) 30 MG 24 hr tablet Take 30 mg by mouth daily.     lansoprazole (PREVACID) 30 MG capsule Take 30 mg by mouth 2 (two) times a day before meals.     lidocaine (XYLOCAINE) 5 % ointment Apply 1 application topically 3 (three) times a day as needed.     menthol (BIOFREEZE, MENTHOL,) 4 % Gel Apply 1 application topically 2 (two) times a day as needed.     methyl salicylate-menthol Oint Apply 1 application topically 3 (three) times a day as needed.     metoprolol succinate (TOPROL-XL) 25 MG Take 50 mg by mouth daily.      nitroglycerin (NITROSTAT) 0.4 MG SL tablet Place 0.4 mg under the tongue every 5 (five) minutes as needed for chest pain.     polyethylene glycol (MIRALAX) 17 gram packet Take 17 g by mouth daily as needed.      senna-docusate (SENNOSIDES-DOCUSATE SODIUM) 8.6-50 mg tablet Take 1 tablet by mouth 2 (two) times a day.      simethicone (MYLICON) 125 MG chewable tablet Chew 125 mg daily as needed.      WARFARIN SODIUM (WARFARIN ORAL) Take by mouth. 3 mg daily       .There were no vitals filed for this visit.  Blood pressure 126/72 pulse 71 respirations 18  Physical Exam  Bilateral trochanteric bursa left greater than right were tender again were prepped and then injected with a total of 40 mg Kenalog mixed with 2% Xylocaine.  Tolerated the procedure well.  Also will try to remove the Barkley catheter.  LABS:   Lab Results   Component Value Date    WBC 6.7 08/01/2017    HGB 12.0 08/01/2017    HCT 38.5 08/01/2017    MCV 96 08/01/2017     08/01/2017     Results for orders placed or  performed in visit on 08/28/17   Basic Metabolic Panel   Result Value Ref Range    Sodium 141 136 - 145 mmol/L    Potassium 4.2 3.5 - 5.0 mmol/L    Chloride 103 98 - 107 mmol/L    CO2 28 22 - 31 mmol/L    Anion Gap, Calculation 10 5 - 18 mmol/L    Glucose 101 70 - 125 mg/dL    Calcium 9.7 8.5 - 10.5 mg/dL    BUN 19 8 - 28 mg/dL    Creatinine 0.82 0.60 - 1.10 mg/dL    GFR MDRD Af Amer >60 >60 mL/min/1.73m2    GFR MDRD Non Af Amer >60 >60 mL/min/1.73m2           ASSESSMENT:      ICD-10-CM    1. Hip pain M25.559    2. Trochanteric bursitis M70.60    3. Urinary retention R33.9    4. Left hemiparesis G81.94        PLAN:    She seemed to do much better even a half hour after the procedure.  Will also continue with the warfarin alternating 4 and 3 mg rechecking a pro time September 19 also remove the Barkley catheter and hopefully she does tolerate that B but we will also do PVRs.    Electronically signed by: Michael Duane Johnson, CNP

## 2021-06-12 NOTE — PROGRESS NOTES
[facility]    Facility:   [facility] [[id number]]   Code Status: DNR      CHIEF COMPLAINT/REASON FOR VISIT:  Chief Complaint   Patient presents with     Follow Up     rehab, hips       HISTORY:      HPI: Lien is a 86 y.o. female who I had the pleasure of visiting with today secondary to hospitalization July 20 - July 29 secondary subarachnoid hemorrhage after a fall.  She was on Coumadin.  Did develop a left visual field cut along with dysphagia dysarthria.  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  Had a chance to visit with her and also therapy today making pretty good progress overall and is currently having some questionable hip pain and as I look at and evaluate and assess her today she actually has bilateral trochanteric bursitis and that also seems to coincide with the inability to lay on her side in bed we did talk about various treatment modalities and will go ahead and try an injection next week into those trochanteric bursa area so we will get that started.  She also is on Coumadin will continue with the current dose recheck a pro time on September 12.  Her blood pressures have been quite normotensive we will decrease hydralazine 25 mg twice daily rather than 3 times a day.  Her finger with a gout tophi actually has improved nicely.  Is on Prevacid twice a day for reflux.  Does have urinary retention next week we will consider removing the Barkley catheter and do a trial of PVR and I will also help her do more transferring and ambulation.  Swallowing much better is on a regular diet.    Past Medical History:   Diagnosis Date     Acute CVA (cerebrovascular accident)      Arterial occlusion      Artery of extremity, embolism and thrombosis      Brachial artery occlusion, right      CAD (coronary artery disease)      Chronic a-fib       Chronic diastolic CHF (congestive heart failure)      Dementia      Dysphagia      Emphysema, unspecified      HTN (hypertension)      Leukocytosis      Post-polio syndrome      SAH (subarachnoid hemorrhage)      Urinary retention              Family History   Problem Relation Age of Onset     Cardiovascular Mother      Cardiovascular Father      Social History     Social History     Marital status:      Spouse name: N/A     Number of children: N/A     Years of education: N/A     Social History Main Topics     Smoking status: Current Some Day Smoker     Types: Cigarettes     Smokeless tobacco: Not on file     Alcohol use 0.6 oz/week     1 Glasses of wine per week     Drug use: Not on file     Sexual activity: Not on file     Other Topics Concern     Not on file     Social History Narrative         Review of Systems  Currently no reports of fever chills fatigue cough or cold flulike symptoms nausea vomiting diarrhea unusual aches or pains rashes or sores.  History of urinary retention with Barkley catheter.  History of right brachial occlusive thrombus status post embolectomy, left hemiparesis GERD hypertension    Current Outpatient Prescriptions   Medication Sig     acetaminophen (TYLENOL) 500 MG tablet Take 1,000 mg by mouth 3 (three) times a day.     allopurinol (ZYLOPRIM) 100 MG tablet Take 100 mg by mouth 2 (two) times a day.     aspirin 325 MG tablet Take 325 mg by mouth daily.     calcium carbonate-vitamin D3 (CALTRATE 600 PLUS D3) 600 mg(1,500mg) -400 unit per tablet Take 1 tablet by mouth daily.     cholecalciferol, vitamin D3, 1,000 unit tablet Take 1,000 Units by mouth daily.     colchicine 0.6 mg tablet Take 0.6 mg by mouth daily.     docusate sodium (COLACE) 100 MG capsule Take 100 mg by mouth 2 (two) times a day as needed for constipation.     famotidine (PEPCID) 20 MG tablet Take 40 mg by mouth at bedtime as needed for heartburn.     furosemide (LASIX) 20 MG tablet Take 20 mg by mouth daily.       hydrALAZINE (APRESOLINE) 25 MG tablet Take 25 mg by mouth 2 (two) times a day.      isosorbide mononitrate (IMDUR) 30 MG 24 hr tablet Take 30 mg by mouth daily.     lansoprazole (PREVACID) 30 MG capsule Take 30 mg by mouth 2 (two) times a day before meals.     lidocaine (XYLOCAINE) 5 % ointment Apply 1 application topically 3 (three) times a day as needed.     menthol (BIOFREEZE, MENTHOL,) 4 % Gel Apply 1 application topically 2 (two) times a day as needed.     methyl salicylate-menthol Oint Apply 1 application topically 3 (three) times a day as needed.     metoprolol succinate (TOPROL-XL) 25 MG Take 50 mg by mouth daily.      nitroglycerin (NITROSTAT) 0.4 MG SL tablet Place 0.4 mg under the tongue every 5 (five) minutes as needed for chest pain.     polyethylene glycol (MIRALAX) 17 gram packet Take 17 g by mouth daily as needed.      senna-docusate (SENNOSIDES-DOCUSATE SODIUM) 8.6-50 mg tablet Take 1 tablet by mouth 2 (two) times a day.      simethicone (MYLICON) 125 MG chewable tablet Chew 125 mg daily as needed.      WARFARIN SODIUM (WARFARIN ORAL) Take by mouth. 3 mg daily       .There were no vitals filed for this visit.  Blood pressure 99/57 pulse 69 respirations 17  Physical Exam   Constitutional: No distress.   HENT:   Head: Normocephalic.   Eyes: Pupils are equal, round, and reactive to light.   Neck: Neck supple. No thyromegaly present.   Cardiovascular:   Irregularity.  No lower extremity edema.   Pulmonary/Chest: Breath sounds normal.   Dysphagia.   Abdominal: Bowel sounds are normal. There is no tenderness. There is no guarding.   Genitourinary:   Genitourinary Comments: Urinary retention.  Barkley catheter.   Musculoskeletal:   Left hemiparesis.  History of CVA right/occipital.   Right arm embolectomy of right brachial and ulnar arteries.  Right middle finger gout tophi.  Bilateral trochanteric bursitis  Lymphadenopathy:     She has no cervical adenopathy.   Neurological: She is alert.   Dysarthria.      LABS:   Lab Results   Component Value Date    WBC 6.7 08/01/2017    HGB 12.0 08/01/2017    HCT 38.5 08/01/2017    MCV 96 08/01/2017     08/01/2017     Results for orders placed or performed in visit on 08/28/17   Basic Metabolic Panel   Result Value Ref Range    Sodium 141 136 - 145 mmol/L    Potassium 4.2 3.5 - 5.0 mmol/L    Chloride 103 98 - 107 mmol/L    CO2 28 22 - 31 mmol/L    Anion Gap, Calculation 10 5 - 18 mmol/L    Glucose 101 70 - 125 mg/dL    Calcium 9.7 8.5 - 10.5 mg/dL    BUN 19 8 - 28 mg/dL    Creatinine 0.82 0.60 - 1.10 mg/dL    GFR MDRD Af Amer >60 >60 mL/min/1.73m2    GFR MDRD Non Af Amer >60 >60 mL/min/1.73m2           ASSESSMENT:      ICD-10-CM    1. Trochanteric bursitis M70.60    2. Urinary retention R33.9    3. Dysphagia R13.10    4. Hypertension I10        PLAN:    Adding next week a bilateral trochanteric bursa injection also decreasing hydralazine 25 mg twice daily continue with 4 mg of warfarin rechecking a pro time on September 12.  Continue to work with therapy and monitor her blood pressures.        Electronically signed by: Michael Duane Johnson, CNP

## 2021-06-12 NOTE — PROGRESS NOTES
[facility]    Facility:   [facility] [[id number]]   Code Status: DNR      CHIEF COMPLAINT/REASON FOR VISIT:  Chief Complaint   Patient presents with     Follow Up     cereb infarc, arm embolis       HISTORY:      HPI: Lien is a 86 y.o. female who I had the pleasure of visiting with again today secondary to her hospitalization July 20 - July 29, 2017 secondary to subarachnoid hemorrhage after a fall and was on Coumadin.  She did developed left visual field cut deficit along with dysarthria was found to have an acute occipital CVA.  Most recent hospitalization August 14 - August 16, 2017 secondary to right brachial occlusive embolus status post embolectomy of the right brachial and ulnar arteries on August 6, 2017.    Does have staples in place does see the vascular surgeon today.  Working with therapy working with strengthening and is able to use a podium type walker and trying to get her leg strength back in her body to work out.  She is on Coumadin 4 mg rechecking a pro time next week.  Is on Vantin antibiotic with a PICC line until August 26, 2017.  For pain is on Tylenol thousand milligrams 3 times a day does have Dilaudid as needed usually 1 or 2 doses per day.  Also did develop right finger middle lateral gout tophi.  Was started on colchicine my colleague did add allopurinol and prednisone.  Not having any edema we will decrease the furosemide 20 mg rather than 40 mg also repeat the BMP next week and the 28th.    Past Medical History:   Diagnosis Date     Acute CVA (cerebrovascular accident)      Arterial occlusion      Artery of extremity, embolism and thrombosis      Brachial artery occlusion, right      CAD (coronary artery disease)      Chronic a-fib      Chronic diastolic CHF (congestive heart failure)      Dementia      Dysphagia      Emphysema, unspecified      HTN (hypertension)      Leukocytosis      Post-polio syndrome      SAH (subarachnoid hemorrhage)       Urinary retention              Family History   Problem Relation Age of Onset     Cardiovascular Mother      Cardiovascular Father      Social History     Social History     Marital status:      Spouse name: N/A     Number of children: N/A     Years of education: N/A     Social History Main Topics     Smoking status: Current Some Day Smoker     Types: Cigarettes     Smokeless tobacco: Not on file     Alcohol use 0.6 oz/week     1 Glasses of wine per week     Drug use: Not on file     Sexual activity: Not on file     Other Topics Concern     Not on file     Social History Narrative         Review of Systems  Currently no reports of fever chills fatigue cough or cold flulike symptoms nausea vomiting diarrhea unusual aches or pains rashes or sores.  History of urinary retention with Barkley catheter.  History of right brachial occlusive thrombus status post embolectomy, left hemiparesis GERD hypertension      Current Outpatient Prescriptions:      acetaminophen (TYLENOL) 500 MG tablet, Take 1,000 mg by mouth 3 (three) times a day., Disp: , Rfl:      allopurinol (ZYLOPRIM) 100 MG tablet, Take 100 mg by mouth 2 (two) times a day., Disp: , Rfl:      aspirin 325 MG tablet, Take 325 mg by mouth daily., Disp: , Rfl:      calcium carbonate-vitamin D3 (CALTRATE 600 PLUS D3) 600 mg(1,500mg) -400 unit per tablet, Take 1 tablet by mouth daily., Disp: , Rfl:      cefpodoxime (VANTIN) 200 MG tablet, Take 200 mg by mouth 2 (two) times a day., Disp: , Rfl:      cholecalciferol, vitamin D3, 1,000 unit tablet, Take 1,000 Units by mouth daily., Disp: , Rfl:      colchicine 0.6 mg tablet, Take 0.6 mg by mouth daily., Disp: , Rfl:      docusate sodium (COLACE) 100 MG capsule, Take 100 mg by mouth 2 (two) times a day as needed for constipation., Disp: , Rfl:      famotidine (PEPCID) 20 MG tablet, Take 40 mg by mouth at bedtime as needed for heartburn., Disp: , Rfl:      furosemide (LASIX) 20 MG tablet, Take 40 mg by mouth daily.,  Disp: , Rfl:      hydrALAZINE (APRESOLINE) 25 MG tablet, Take 25 mg by mouth 3 (three) times a day., Disp: , Rfl:      HYDROmorphone (DILAUDID) 2 MG tablet, Take 1 mg by mouth every 6 (six) hours as needed for pain., Disp: , Rfl:      isosorbide mononitrate (IMDUR) 30 MG 24 hr tablet, Take 30 mg by mouth daily., Disp: , Rfl:      lansoprazole (PREVACID) 30 MG capsule, Take 30 mg by mouth 2 (two) times a day before meals., Disp: , Rfl:      lidocaine (XYLOCAINE) 5 % ointment, Apply 1 application topically 3 (three) times a day as needed., Disp: , Rfl:      menthol (BIOFREEZE, MENTHOL,) 4 % Gel, Apply 1 application topically 2 (two) times a day as needed., Disp: , Rfl:      methyl salicylate-menthol Oint, Apply 1 application topically 3 (three) times a day as needed., Disp: , Rfl:      metoprolol succinate (TOPROL-XL) 25 MG, Take 50 mg by mouth daily. , Disp: , Rfl:      nitroglycerin (NITROSTAT) 0.4 MG SL tablet, Place 0.4 mg under the tongue every 5 (five) minutes as needed for chest pain., Disp: , Rfl:      polyethylene glycol (MIRALAX) 17 gram packet, Take 17 g by mouth daily as needed. , Disp: , Rfl:      predniSONE (DELTASONE) 5 MG tablet, Take 5 mg by mouth daily., Disp: , Rfl:      senna-docusate (SENNOSIDES-DOCUSATE SODIUM) 8.6-50 mg tablet, Take 1 tablet by mouth 2 (two) times a day. , Disp: , Rfl:      simethicone (MYLICON) 125 MG chewable tablet, Chew 125 mg daily as needed. , Disp: , Rfl:      WARFARIN SODIUM (WARFARIN ORAL), Take by mouth. 3 mg daily, Disp: , Rfl:     .There were no vitals filed for this visit.  Blood pressure 115/66 pulse 74 respirations 22 temperature 96.7  Physical Exam   Constitutional: No distress.   HENT:   Head: Normocephalic.   Eyes: Pupils are equal, round, and reactive to light.   Neck: Neck supple. No thyromegaly present.   Cardiovascular:   Irregularity.  No lower extremity edema.   Pulmonary/Chest: Breath sounds normal.   Dysphagia.   Abdominal: Bowel sounds are normal.  There is no tenderness. There is no guarding.   Genitourinary:   Genitourinary Comments: Urinary retention.  Barkley catheter.   Musculoskeletal:   Left hemiparesis.  History of CVA right/occipital.   Right arm embolectomy of right brachial and ulnar arteries.  Right middle finger gout tophi  Lymphadenopathy:     She has no cervical adenopathy.   Neurological: She is alert.   Dysarthria.   Skin: Skin is warm and dry. No rash noted.      LABS:   No results found for: HGBA1C  Results for orders placed or performed in visit on 08/01/17   Basic Metabolic Panel   Result Value Ref Range    Sodium 143 136 - 145 mmol/L    Potassium 3.8 3.5 - 5.0 mmol/L    Chloride 98 98 - 107 mmol/L    CO2 34 (H) 22 - 31 mmol/L    Anion Gap, Calculation 11 5 - 18 mmol/L    Glucose 112 70 - 125 mg/dL    Calcium 10.0 8.5 - 10.5 mg/dL    BUN 11 8 - 28 mg/dL    Creatinine 0.76 0.60 - 1.10 mg/dL    GFR MDRD Af Amer >60 >60 mL/min/1.73m2    GFR MDRD Non Af Amer >60 >60 mL/min/1.73m2       Lab Results   Component Value Date    WBC 6.7 08/01/2017    HGB 12.0 08/01/2017    HCT 38.5 08/01/2017    MCV 96 08/01/2017     08/01/2017         ASSESSMENT:      ICD-10-CM    1. Gouty tophi of joint M1A.00X1    2. Left hemiparesis G81.94    3. CVA (cerebral vascular accident) I63.9    4. Hypertension I10        PLAN:    I did have a chance to also visit with her wound therapy today.  The right middle finger still does have the gouty tophi but otherwise not terribly tender will continue with the colchicine and allopurinol on prednisone.  She is also getting a mighty shake and extra nutrient supplements.  Does see the vascular surgeon today for the right arm and staple removal.  Rechecking a Coumadin and pro time next week.      Electronically signed by: Michael Duane Johnson, CNP

## 2021-06-12 NOTE — PROGRESS NOTES
[facility]      Facility:    [facility] [[id number]]    Code Status: DNR      Chief Complaint/Reason for Visit:  Chief Complaint   Patient presents with     H & P     readmission       HPI:   Lien is a 86 y.o. female who originally was hospitalized July 4 - July 29, 2017 when she had fallen, and suffered a small subarachnoid hemorrhage while on Coumadin therapy.  While there, she developed a left visual field cut deficit, dysarthria, having suffered an acute occipital CVA.  She had biatrial enlargement which was severe, was started on aspirin.  She has a history of coronary artery disease and hypertension treated with metoprolol and Imdur and statin.  She has known history of chronic congestive diastolic failure  She has chronic A. fib no longer on Coumadin after the subarachnoid hemorrhage  She had dilatation esophageal stricture as well as gastritis.  [facility]    While at the TCU, she developed acute right upper extremity numbness and paralysis on 8/6/17.  She returned to [facility], was found to have a right brachial artery occlusive embolus, thought to be from left atrial thrombus.  She went to the OR for embolectomy that same day.  She had significant postoperative pain, did not want to take narcotics.  The patient had some difficulty with swallowing but speech language path did a swallow study, she was okayed for regular diet, thin liquids.  She was discharged from [facility] and returned to [facility].    August 14 she had significant pain, swelling in her arm and was sent back to [facility].  Vascular studies were unchanged, there was no new clot.  She was sent back with instructions to elevate her arm, had a firm Ace wrap over the site of the previous embolectomy.  She returns to the [facility] for ongoing therapies and pain control    Past Medical History:  Past Medical History:    Diagnosis Date     Acute CVA (cerebrovascular accident)      Arterial occlusion      Artery of extremity, embolism and thrombosis      CAD (coronary artery disease)      Chronic a-fib      Chronic diastolic CHF (congestive heart failure)      Dementia      Dysphagia      Emphysema, unspecified      HTN (hypertension)      Leukocytosis      Post-polio syndrome      SAH (subarachnoid hemorrhage)      Urinary retention            Surgical History:  Past Surgical History:   Procedure Laterality Date     BRACHIAL EMBOLECTOMY Right 08/2006    right upper extremity embolectomy        Family History:   Family History   Problem Relation Age of Onset     Cardiovascular Mother      Cardiovascular Father        Social History:    Social History     Social History     Marital status:      Spouse name: N/A     Number of children: N/A     Years of education: N/A     Social History Main Topics     Smoking status: Current Some Day Smoker     Types: Cigarettes     Smokeless tobacco: Not on file     Alcohol use 0.6 oz/week     1 Glasses of wine per week     Drug use: Not on file     Sexual activity: Not on file     Other Topics Concern     Not on file     Social History Narrative          Review of Systems   She pain meds for her arm it is helpful.    At baseline she does not walk much  She has a congenitally weak left arm  Everything seems to hurt plantar fasciitis back her right arm of course.  She feels like her speech is intense fluid as it used to be,  Her appetite is not so good but she tries to eat the same as usual, has chronic dyspnea.  Off-and-on she will get some numbness in her fingers  She lives in assisted living, and gets quite a few services.  The remainder of the comprehensive review of systems is negative    Blood pressure 169/62, pulse 72, temperature 97.5  F (36.4  C), resp. rate 18, SpO2 95 %.        Physical Exam   Constitutional: She is oriented to person, place, and time. No distress.   She looks quite  tired   HENT:   Right Ear: External ear normal.   Left Ear: External ear normal.   Nose: Nose normal.   Mouth/Throat: Oropharynx is clear and moist.   Eyes: Conjunctivae and EOM are normal. No scleral icterus.   Cardiovascular: Normal heart sounds.    Irregularly irregular   Pulmonary/Chest: Breath sounds normal. She has no rales.   Abdominal: Soft. Bowel sounds are normal. She exhibits no distension. There is no tenderness.   Musculoskeletal:   Her right arm is wrapped with a very tight ace   Lymphadenopathy:     She has no cervical adenopathy.   Neurological: She is alert and oriented to person, place, and time.   Left arm with low muscle bulk, decreased range of motion   Skin: No erythema.   Left arm a bit weak (congenital)  Right upper arm incision has staples and Steri-Strips, with some bloody discharge.  She has a full radial pulse   Psychiatric: She has a normal mood and affect. Her behavior is normal. Thought content normal.       Medication List:  Current Outpatient Prescriptions   Medication Sig     acetaminophen (TYLENOL) 500 MG tablet Take 1,000 mg by mouth 3 (three) times a day.     aspirin 325 MG tablet Take 325 mg by mouth daily.     calcium carbonate-vitamin D3 (CALTRATE 600 PLUS D3) 600 mg(1,500mg) -400 unit per tablet Take 1 tablet by mouth daily.     cholecalciferol, vitamin D3, 1,000 unit tablet Take 1,000 Units by mouth daily.     docusate sodium (COLACE) 100 MG capsule Take 100 mg by mouth 2 (two) times a day as needed for constipation.     enoxaparin (LOVENOX) 40 mg/0.4 mL syringe Inject 40 mg under the skin every 12 (twelve) hours.     famotidine (PEPCID) 20 MG tablet Take 40 mg by mouth at bedtime as needed for heartburn.     furosemide (LASIX) 20 MG tablet Take 40 mg by mouth daily.     hydrALAZINE (APRESOLINE) 25 MG tablet Take 25 mg by mouth 3 (three) times a day.     isosorbide mononitrate (IMDUR) 30 MG 24 hr tablet Take 30 mg by mouth daily.     lansoprazole (PREVACID) 30 MG capsule  Take 30 mg by mouth 2 (two) times a day before meals.     menthol (BIOFREEZE, MENTHOL,) 4 % Gel Apply 1 application topically 2 (two) times a day as needed.     metoprolol succinate (TOPROL-XL) 25 MG Take 25 mg by mouth daily.     nitroglycerin (NITROSTAT) 0.4 MG SL tablet Place 0.4 mg under the tongue every 5 (five) minutes as needed for chest pain.     polyethylene glycol (MIRALAX) 17 gram packet Take 17 g by mouth daily.     senna-docusate (SENNOSIDES-DOCUSATE SODIUM) 8.6-50 mg tablet Take 2 tablets by mouth 2 (two) times a day.      simethicone (MYLICON) 125 MG chewable tablet Chew 125 mg daily.     WARFARIN SODIUM (WARFARIN ORAL) Take by mouth. 2.5 mg daily       Labs:   None    Assessment:    ICD-10-CM    1. Pain R52    2. 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 History of embolectomy right brachial artery Z98.890    4. CVA (cerebral vascular accident) I63.9    5. CHF (congestive heart failure) I50.9    6. Urinary retention R33.9    7. Hypertension I10    8. Dysphagia R13.10          Plan:  Wound cares as ordered  Needs to have evaluation with urology  Resume therapies      Electronically signed by: Starla Smalls MD

## 2021-06-13 NOTE — PROGRESS NOTES
[facility]    Facility:   [facility] [[id number]]   Code Status: DNR      CHIEF COMPLAINT/REASON FOR VISIT:  Chief Complaint   Patient presents with     Follow Up     rehab       HISTORY:      HPI: Lien is a 86 y.o. female who I had the pleasure of visiting with today secondary to hospitalization July 20 - July 29, 2017 secondary subarachnoid hemorrhage secondary to fall.  She was on Coumadin.  Still remains with a left visual field cut and also does have dysphagia and dysarthria.  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  Last week I did inject her bilateral trochanters and actually been doing quite well she is able to have decreased pain unfortunately due to the stroke and weakness it is limited but she is able to ambulate a little bit.  I did see her with therapy as well today.  Did fail a voiding trial and does have urinary retention does have a Barkley catheter.  Also felt on her right middle finger has pretty much resolved is on allopurinol once a day.  She also is on Coumadin alternating for 3 mg rechecking a pro time next week.  Did have a care conference on September 15 is assist of 1.  She has been normotensive and afebrile.  She is a little disheartened because she would like to be able to be ambulating soon but it looks like she will need a lot more assistance when she does get discharged.  No swallowing issues.  No heartburn or reflux.    Past Medical History:   Diagnosis Date     Acute CVA (cerebrovascular accident)      Arterial occlusion      Artery of extremity, embolism and thrombosis      Brachial artery occlusion, right      CAD (coronary artery disease)      Chronic a-fib      Chronic diastolic CHF (congestive heart failure)      Dementia      Dysphagia      Emphysema, unspecified      HTN (hypertension)      Leukocytosis       Post-polio syndrome      SAH (subarachnoid hemorrhage)      Urinary retention              Family History   Problem Relation Age of Onset     Cardiovascular Mother      Cardiovascular Father      Social History     Social History     Marital status:      Spouse name: N/A     Number of children: N/A     Years of education: N/A     Social History Main Topics     Smoking status: Current Some Day Smoker     Types: Cigarettes     Smokeless tobacco: Not on file     Alcohol use 0.6 oz/week     1 Glasses of wine per week     Drug use: Not on file     Sexual activity: Not on file     Other Topics Concern     Not on file     Social History Narrative         Review of Systems  Currently no reports of fever chills fatigue cough or cold flulike symptoms nausea vomiting diarrhea unusual aches or pains rashes or sores.  History of urinary retention with Barkley catheter.  History of right brachial occlusive thrombus status post embolectomy, left hemiparesis GERD hypertension    Current Outpatient Prescriptions   Medication Sig     acetaminophen (TYLENOL) 500 MG tablet Take 1,000 mg by mouth 3 (three) times a day.     allopurinol (ZYLOPRIM) 100 MG tablet Take 100 mg by mouth daily.      aspirin 325 MG tablet Take 325 mg by mouth daily.     calcium carbonate-vitamin D3 (CALTRATE 600 PLUS D3) 600 mg(1,500mg) -400 unit per tablet Take 1 tablet by mouth daily.     cholecalciferol, vitamin D3, 1,000 unit tablet Take 1,000 Units by mouth daily.     docusate sodium (COLACE) 100 MG capsule Take 100 mg by mouth 2 (two) times a day as needed for constipation.     famotidine (PEPCID) 20 MG tablet Take 40 mg by mouth at bedtime as needed for heartburn.     furosemide (LASIX) 20 MG tablet Take 20 mg by mouth daily.      hydrALAZINE (APRESOLINE) 25 MG tablet Take 25 mg by mouth 2 (two) times a day.      isosorbide mononitrate (IMDUR) 30 MG 24 hr tablet Take 30 mg by mouth daily.     lansoprazole (PREVACID) 30 MG capsule Take 30 mg by mouth  2 (two) times a day before meals.     lidocaine (XYLOCAINE) 5 % ointment Apply 1 application topically 3 (three) times a day as needed.     menthol (BIOFREEZE, MENTHOL,) 4 % Gel Apply 1 application topically 2 (two) times a day as needed.     methyl salicylate-menthol Oint Apply 1 application topically 3 (three) times a day as needed.     metoprolol succinate (TOPROL-XL) 25 MG Take 50 mg by mouth daily.      nitroglycerin (NITROSTAT) 0.4 MG SL tablet Place 0.4 mg under the tongue every 5 (five) minutes as needed for chest pain.     polyethylene glycol (MIRALAX) 17 gram packet Take 17 g by mouth daily as needed.      senna-docusate (SENNOSIDES-DOCUSATE SODIUM) 8.6-50 mg tablet Take 1 tablet by mouth 2 (two) times a day.      simethicone (MYLICON) 125 MG chewable tablet Chew 125 mg daily as needed.      WARFARIN SODIUM (WARFARIN ORAL) Take by mouth. 3 mg daily       .There were no vitals filed for this visit.  Blood pressure 116/71 pulse 61 respirations 18  Physical Exam   Constitutional: No distress.   HENT:   Head: Normocephalic.   Eyes: Pupils are equal, round, and reactive to light.   Neck: Neck supple. No thyromegaly present.   Cardiovascular:   Irregularity.  No lower extremity edema.   Pulmonary/Chest: Breath sounds normal.   Dysphagia.   Abdominal: Bowel sounds are normal. There is no tenderness. There is no guarding.   Genitourinary:   Genitourinary Comments: Urinary retention.  Barkley catheter.   Musculoskeletal:   Left hemiparesis.  History of CVA right/occipital.   Right arm embolectomy of right brachial and ulnar arteries.   Lymphadenopathy:     She has no cervical adenopathy.   Neurological: She is alert.   Dysarthria.        LABS:   Lab Results   Component Value Date    WBC 6.7 08/01/2017    HGB 12.0 08/01/2017    HCT 38.5 08/01/2017    MCV 96 08/01/2017     08/01/2017     Results for orders placed or performed in visit on 08/28/17   Basic Metabolic Panel   Result Value Ref Range    Sodium 141 136  - 145 mmol/L    Potassium 4.2 3.5 - 5.0 mmol/L    Chloride 103 98 - 107 mmol/L    CO2 28 22 - 31 mmol/L    Anion Gap, Calculation 10 5 - 18 mmol/L    Glucose 101 70 - 125 mg/dL    Calcium 9.7 8.5 - 10.5 mg/dL    BUN 19 8 - 28 mg/dL    Creatinine 0.82 0.60 - 1.10 mg/dL    GFR MDRD Af Amer >60 >60 mL/min/1.73m2    GFR MDRD Non Af Amer >60 >60 mL/min/1.73m2           ASSESSMENT:      ICD-10-CM    1. Trochanteric bursitis M70.60    2. Left hemiparesis G81.94    3. Urinary retention R33.9    4. Hypertension I10        PLAN:    No new changes at this time.  Did encourage her therapy.  Did have a care conference on the 15th.  Did fail a voiding trial.  Coumadin alternating 4 and 3 mg rechecking a pro time on September 26.        Electronically signed by: Michael Duane Johnson, CNP

## 2021-06-13 NOTE — PROGRESS NOTES
[facility]    Facility:   [facility] [[id number]]   Code Status: DNR      CHIEF COMPLAINT/REASON FOR VISIT:  Chief Complaint   Patient presents with     Follow Up     rehab, cva,       HISTORY:      HPI: Lien is a 86 y.o. female who I had the pleasure to visit with secondary to her hospitalization July 20 - July 29, 2017 secondary subarachnoid hemorrhage secondary to a fall.  She does have a left visual field cut left hemiparesis dysphasia and dysarthria.  She also was hospitalized August 14 - August 16, 2017 secondary to right brachial occlusive embolus status post embolectomy of the brachial and ulnar arteries.  Had a chance to talk about her progress and she does get a little teary-eyed at times because she is pretty much plateaued and she is not comfortable with that she does not want to go to a nursing home and she is saddened because she does not have a lot of extra options at this time.  It is pretty limited what she can do from a therapy standpoint on this particular TCU.  She is not having any significant pain she had had good success after I injected her bilateral trochanters about a week ago.  Very minimal ambulation but needs full assistance with ambulation.  She did fail a Barkley catheter removal trial.  She is on Coumadin next pro time September 26.  No choking or swallowing problems.    Past Medical History:   Diagnosis Date     Acute CVA (cerebrovascular accident)      Arterial occlusion      Artery of extremity, embolism and thrombosis      Brachial artery occlusion, right      CAD (coronary artery disease)      Chronic a-fib      Chronic diastolic CHF (congestive heart failure)      Dementia      Dysphagia      Emphysema, unspecified      HTN (hypertension)      Leukocytosis      Post-polio syndrome      SAH (subarachnoid hemorrhage)      Urinary retention              Family History   Problem Relation Age of Onset     Cardiovascular Mother       Cardiovascular Father      Social History     Social History     Marital status:      Spouse name: N/A     Number of children: N/A     Years of education: N/A     Social History Main Topics     Smoking status: Current Some Day Smoker     Types: Cigarettes     Smokeless tobacco: Not on file     Alcohol use 0.6 oz/week     1 Glasses of wine per week     Drug use: Not on file     Sexual activity: Not on file     Other Topics Concern     Not on file     Social History Narrative         Review of Systems  Currently no reports of fever chills fatigue cough or cold flulike symptoms nausea vomiting diarrhea unusual aches or pains rashes or sores.  History of urinary retention with Barkley catheter.  History of right brachial occlusive thrombus status post embolectomy, left hemiparesis GERD hypertension          Current Outpatient Prescriptions   Medication Sig     acetaminophen (TYLENOL) 500 MG tablet Take 1,000 mg by mouth 3 (three) times a day.     allopurinol (ZYLOPRIM) 100 MG tablet Take 100 mg by mouth daily.      aspirin 325 MG tablet Take 325 mg by mouth daily.     calcium carbonate-vitamin D3 (CALTRATE 600 PLUS D3) 600 mg(1,500mg) -400 unit per tablet Take 1 tablet by mouth daily.     cholecalciferol, vitamin D3, 1,000 unit tablet Take 1,000 Units by mouth daily.     docusate sodium (COLACE) 100 MG capsule Take 100 mg by mouth 2 (two) times a day as needed for constipation.     famotidine (PEPCID) 20 MG tablet Take 40 mg by mouth at bedtime as needed for heartburn.     furosemide (LASIX) 20 MG tablet Take 20 mg by mouth daily.      hydrALAZINE (APRESOLINE) 25 MG tablet Take 25 mg by mouth 2 (two) times a day.      isosorbide mononitrate (IMDUR) 30 MG 24 hr tablet Take 30 mg by mouth daily.     lansoprazole (PREVACID) 30 MG capsule Take 30 mg by mouth 2 (two) times a day before meals.     lidocaine (XYLOCAINE) 5 % ointment Apply 1 application topically 3 (three) times a day as needed.     menthol (BIOFREEZE,  MENTHOL,) 4 % Gel Apply 1 application topically 2 (two) times a day as needed.     methyl salicylate-menthol Oint Apply 1 application topically 3 (three) times a day as needed.     metoprolol succinate (TOPROL-XL) 25 MG Take 50 mg by mouth daily.      nitroglycerin (NITROSTAT) 0.4 MG SL tablet Place 0.4 mg under the tongue every 5 (five) minutes as needed for chest pain.     polyethylene glycol (MIRALAX) 17 gram packet Take 17 g by mouth daily as needed.      senna-docusate (SENNOSIDES-DOCUSATE SODIUM) 8.6-50 mg tablet Take 1 tablet by mouth 2 (two) times a day.      simethicone (MYLICON) 125 MG chewable tablet Chew 125 mg daily as needed.      WARFARIN SODIUM (WARFARIN ORAL) Take by mouth. 3 mg daily       .There were no vitals filed for this visit.  Blood pressure 135/56 pulse 71 respirations 18 temperature 97.2  Physical Exam   Constitutional: No distress.   HENT:   Head: Normocephalic.   Eyes: Pupils are equal, round, and reactive to light.   Neck: Neck supple. No thyromegaly present.   Cardiovascular:   Irregularity.  No lower extremity edema.   Pulmonary/Chest: Breath sounds normal.   Dysphagia.   Abdominal: Bowel sounds are normal. There is no tenderness. There is no guarding.   Genitourinary:   Genitourinary Comments: Urinary retention.  Barkley catheter.   Musculoskeletal:   Left hemiparesis.  History of CVA right/occipital.   Right arm embolectomy of right brachial and ulnar arteries.   Lymphadenopathy:     She has no cervical adenopathy.   Neurological: She is alert.   Dysarthria.          LABS:   Lab Results   Component Value Date    WBC 6.7 08/01/2017    HGB 12.0 08/01/2017    HCT 38.5 08/01/2017    MCV 96 08/01/2017     08/01/2017     Results for orders placed or performed in visit on 08/28/17   Basic Metabolic Panel   Result Value Ref Range    Sodium 141 136 - 145 mmol/L    Potassium 4.2 3.5 - 5.0 mmol/L    Chloride 103 98 - 107 mmol/L    CO2 28 22 - 31 mmol/L    Anion Gap, Calculation 10 5 - 18  mmol/L    Glucose 101 70 - 125 mg/dL    Calcium 9.7 8.5 - 10.5 mg/dL    BUN 19 8 - 28 mg/dL    Creatinine 0.82 0.60 - 1.10 mg/dL    GFR MDRD Af Amer >60 >60 mL/min/1.73m2    GFR MDRD Non Af Amer >60 >60 mL/min/1.73m2           ASSESSMENT:      ICD-10-CM    1. CVA (cerebral vascular accident) I63.9    2. Dysarthria R47.1    3. Left hemiparesis G81.94    4. CHF (congestive heart failure) I50.9        PLAN:    At this time it is pretty much of her hip and weight situation I am not sure if they are waiting for a home placement where she might go but she certainly is saddened because of the lack of progress at this time so we did talk about the comorbid conditions that she is currently struggling with.  She will keep me updated    l    Electronically signed by: Michael Duane Johnson, CNP

## 2021-06-13 NOTE — PROGRESS NOTES
[facility]    Facility:   [facility] [[id number]]   Code Status: DNR  PCP: Provider Not in System   Phone: None   Fax: [phone]      CHIEF COMPLAINT/REASON FOR VISIT:  Chief Complaint   Patient presents with     Discharge Summary       HISTORY COURSE:  Lien is a 86 y.o. female who originally was hospitalized July 4 - July 29, 2017 when she had fallen, and suffered a small subarachnoid hemorrhage while on Coumadin therapy.  While there, she developed a left visual field cut deficit, dysarthria, having suffered an acute occipital CVA.  She had biatrial enlargement which was severe, was started on aspirin.  She has a history of coronary artery disease and hypertension treated with metoprolol and Imdur and statin.  She has known history of chronic congestive diastolic failure  She has chronic A. fib no longer on Coumadin after the subarachnoid hemorrhage  She had dilatation esophageal stricture as well as gastritis.  [facility]     While at the TCU, she developed acute right upper extremity numbness and paralysis on 8/6/17.  She returned to [facility], was found to have a right brachial artery occlusive embolus, thought to be from left atrial thrombus.  She went to the OR for embolectomy that same day.  She had significant postoperative pain, did not want to take narcotics.  The patient had some difficulty with swallowing but speech language path did a swallow study, she was okayed for regular diet, thin liquids.  She was discharged from [facility] and returned to [facility].     August 14 she had significant pain, swelling in her arm and was sent back to [facility].  Vascular studies were unchanged, there was no new clot.  She was sent back with instructions to elevate her arm, had a firm Ace wrap over the site of the previous embolectomy.  She returns to the [facility]CU[facility]3 for ongoing therapies and  pain control  She has been able to participate in therapy but it actually has been quite limited due to her cerebral infarction.  At this point is not independent and does need assistance with all ADLs.  During her stay we have adjusted a few of her medications.  She has been normotensive and afebrile.  Her pain seems to be managed well on Tylenol.  Adjusting her Coumadin.  She did fail Barkley catheter removal and PVR trial she will be discharged with a Barkley catheter.  Also did inject her trochanteric bursa secondary to trochanteric bursitis which was helpful for a couple of weeks.  Also did follow-up with speech therapy for swallowing.  Also adjusting the Coumadin.  Getting extra holes nutrient supplements.  Review of Systems  Currently no reports of fever chills fatigue cough or cold flulike symptoms nausea vomiting diarrhea unusual aches or pains rashes or sores.  History of urinary retention with Barkley catheter.  History of right brachial occlusive thrombus status post embolectomy, left hemiparesis GERD hypertension  Vitals:    09/26/17 1406   BP: 103/62   Pulse: 68   Resp: 18   Temp: 97.5  F (36.4  C)       Physical Exam  Constitutional: No distress.   HENT:   Head: Normocephalic.   Eyes: Pupils are equal, round, and reactive to light.   Neck: Neck supple. No thyromegaly present.   Cardiovascular:   Irregularity.  No lower extremity edema.   Pulmonary/Chest: Breath sounds normal.   Dysphagia.   Abdominal: Bowel sounds are normal. There is no tenderness. There is no guarding.   Genitourinary:   Genitourinary Comments: Urinary retention.  Barkley catheter.   Musculoskeletal:   Left hemiparesis.  History of CVA right/occipital.   Right arm embolectomy of right brachial and ulnar arteries.   Lymphadenopathy:     She has no cervical adenopathy.   Neurological: She is alert.   Dysarthria.          Lab Results   Component Value Date    WBC 6.7 08/01/2017    HGB 12.0 08/01/2017    HCT 38.5 08/01/2017    MCV 96 08/01/2017      08/01/2017     Results for orders placed or performed in visit on 08/28/17   Basic Metabolic Panel   Result Value Ref Range    Sodium 141 136 - 145 mmol/L    Potassium 4.2 3.5 - 5.0 mmol/L    Chloride 103 98 - 107 mmol/L    CO2 28 22 - 31 mmol/L    Anion Gap, Calculation 10 5 - 18 mmol/L    Glucose 101 70 - 125 mg/dL    Calcium 9.7 8.5 - 10.5 mg/dL    BUN 19 8 - 28 mg/dL    Creatinine 0.82 0.60 - 1.10 mg/dL    GFR MDRD Af Amer >60 >60 mL/min/1.73m2    GFR MDRD Non Af Amer >60 >60 mL/min/1.73m2         MEDICATION LIST:  Current Outpatient Prescriptions   Medication Sig     acetaminophen (TYLENOL) 500 MG tablet Take 1,000 mg by mouth 3 (three) times a day.     allopurinol (ZYLOPRIM) 100 MG tablet Take 100 mg by mouth daily.      aspirin 325 MG tablet Take 325 mg by mouth daily.     calcium carbonate-vitamin D3 (CALTRATE 600 PLUS D3) 600 mg(1,500mg) -400 unit per tablet Take 1 tablet by mouth daily.     cholecalciferol, vitamin D3, 1,000 unit tablet Take 1,000 Units by mouth daily.     furosemide (LASIX) 20 MG tablet Take 20 mg by mouth daily.      hydrALAZINE (APRESOLINE) 25 MG tablet Take 25 mg by mouth 2 (two) times a day.      isosorbide mononitrate (IMDUR) 30 MG 24 hr tablet Take 30 mg by mouth daily.     lansoprazole (PREVACID) 30 MG capsule Take 30 mg by mouth 2 (two) times a day before meals.     lidocaine (XYLOCAINE) 5 % ointment Apply 1 application topically 3 (three) times a day as needed.     metoprolol succinate (TOPROL-XL) 25 MG Take 50 mg by mouth daily.      nitroglycerin (NITROSTAT) 0.4 MG SL tablet Place 0.4 mg under the tongue every 5 (five) minutes as needed for chest pain.     senna-docusate (SENNOSIDES-DOCUSATE SODIUM) 8.6-50 mg tablet Take 1 tablet by mouth 2 (two) times a day.      WARFARIN SODIUM (WARFARIN ORAL) Take by mouth. 3 mg daily       DISCHARGE DIAGNOSIS:    ICD-10-CM    1. CVA (cerebral vascular accident) I63.9    2. GERD (gastroesophageal reflux disease) K21.9    3.  Hypertension I10    4. CHF (congestive heart failure) I50.9        MEDICAL EQUIPMENT NEEDS:  Walker.  Wheelchair.    DISCHARGE PLAN/FACE TO FACE:  I certify that services are/were furnished while this patient was under the care of a physician and that a physician or an allowed non-physician practitioner (NPP), had a face-to-face encounter that meets the physician face-to-face encounter requirements. The encounter was in whole, or in part, related to the primary reason for home health. The patient is confined to his/her home and needs intermittent skilled nursing, physical therapy, speech-language pathology, or the continued need for occupational therapy. A plan of care has been established by a physician and is periodically reviewed by a physician.  Date of Face-to-Face Encounter: September 26, 2017    I certify that, based on my findings, the following services are medically necessary home health services: She will be discharged to the [facility] assisted living facility with current medications with interim home care for physical and occupational therapy and a home health aide.    My clinical findings support the need for the above skilled services because: (Please write a brief narrative summary that describes what the RN, PT, SLP, or other services will be doing in the home. A list of diagnoses in this section does not meet the CMS requirements.)  Secondary to generalized debility chronic medical conditions and a history of stroke    This patient is homebound because: (Please write a brief narrative summary describing the functional limitations as to why this patient is homebound and specifically what makes this patient homebound.)  Secondary to multiple chronic medical conditions    The patient is, or has been, under my care and I have initiated the establishment of the plan of care. This patient will be followed by a physician who will periodically review the plan of care.  She will follow-up with her primary  care doctor regarding medication management and any future laboratory studies.  Currently on Coumadin alternating 4 and 3 mg rechecking a pro time next week.  She will be discharged to the [facility] assisted living facility.  Continue to monitor her blood pressures and overall status her nutrition as well as her urinary retention and dysphagia.    Discharge coordination of care greater than 30 minutes.    Electronically signed by: Michael Duane Johnson, CNP

## 2021-06-13 NOTE — PROGRESS NOTES
[facility]    Facility:   [facility] [[id number]]   Code Status: DNR      CHIEF COMPLAINT/REASON FOR VISIT:  Chief Complaint   Patient presents with     Follow Up     ott, rehab, troch bursa       HISTORY:      HPI: Lien is a 86 y.o. female who I had the pleasure of visiting with today secondary to hospitalization July 20 - July 29 secondary to subarachnoid hemorrhage after a fall.  She was on Coumadin.  Did develop a left visual field cut along with dysphasia and dysarthria.  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  Recently this week did inject each of the trochanters and it has been very successful she is very pleased with the results so far and actually she is thinking about getting her right knee injected at some point in the future but not yet.  We also did repeat move the Ott we did a voiding trial it was not successful so the Ott catheter is back in again.  She is on Coumadin next pro time September 19.  Also the finger gout tophi has significantly improved we will now discontinue the colchicine and also take the allopurinol 100 mg daily rather than twice daily.  She is also on a swallow plan no current dysphagia or choking.  Also in a house nutrient supplement.  She has been normotensive and afebrile.  Actually doing well from a therapy standpoint.  For pain she does take Tylenol 3 times a day also on Prevacid for GERD.    Past Medical History:   Diagnosis Date     Acute CVA (cerebrovascular accident)      Arterial occlusion      Artery of extremity, embolism and thrombosis      Brachial artery occlusion, right      CAD (coronary artery disease)      Chronic a-fib      Chronic diastolic CHF (congestive heart failure)      Dementia      Dysphagia      Emphysema, unspecified      HTN (hypertension)      Leukocytosis      Post-polio  syndrome      SAH (subarachnoid hemorrhage)      Urinary retention              Family History   Problem Relation Age of Onset     Cardiovascular Mother      Cardiovascular Father      Social History     Social History     Marital status:      Spouse name: N/A     Number of children: N/A     Years of education: N/A     Social History Main Topics     Smoking status: Current Some Day Smoker     Types: Cigarettes     Smokeless tobacco: Not on file     Alcohol use 0.6 oz/week     1 Glasses of wine per week     Drug use: Not on file     Sexual activity: Not on file     Other Topics Concern     Not on file     Social History Narrative         Review of Systems  Currently no reports of fever chills fatigue cough or cold flulike symptoms nausea vomiting diarrhea unusual aches or pains rashes or sores.  History of urinary retention with Barkley catheter.  History of right brachial occlusive thrombus status post embolectomy, left hemiparesis GERD hypertension    Current Outpatient Prescriptions   Medication Sig     acetaminophen (TYLENOL) 500 MG tablet Take 1,000 mg by mouth 3 (three) times a day.     allopurinol (ZYLOPRIM) 100 MG tablet Take 100 mg by mouth daily.      aspirin 325 MG tablet Take 325 mg by mouth daily.     calcium carbonate-vitamin D3 (CALTRATE 600 PLUS D3) 600 mg(1,500mg) -400 unit per tablet Take 1 tablet by mouth daily.     cholecalciferol, vitamin D3, 1,000 unit tablet Take 1,000 Units by mouth daily.     docusate sodium (COLACE) 100 MG capsule Take 100 mg by mouth 2 (two) times a day as needed for constipation.     famotidine (PEPCID) 20 MG tablet Take 40 mg by mouth at bedtime as needed for heartburn.     furosemide (LASIX) 20 MG tablet Take 20 mg by mouth daily.      hydrALAZINE (APRESOLINE) 25 MG tablet Take 25 mg by mouth 2 (two) times a day.      isosorbide mononitrate (IMDUR) 30 MG 24 hr tablet Take 30 mg by mouth daily.     lansoprazole (PREVACID) 30 MG capsule Take 30 mg by mouth 2 (two)  times a day before meals.     lidocaine (XYLOCAINE) 5 % ointment Apply 1 application topically 3 (three) times a day as needed.     menthol (BIOFREEZE, MENTHOL,) 4 % Gel Apply 1 application topically 2 (two) times a day as needed.     methyl salicylate-menthol Oint Apply 1 application topically 3 (three) times a day as needed.     metoprolol succinate (TOPROL-XL) 25 MG Take 50 mg by mouth daily.      nitroglycerin (NITROSTAT) 0.4 MG SL tablet Place 0.4 mg under the tongue every 5 (five) minutes as needed for chest pain.     polyethylene glycol (MIRALAX) 17 gram packet Take 17 g by mouth daily as needed.      senna-docusate (SENNOSIDES-DOCUSATE SODIUM) 8.6-50 mg tablet Take 1 tablet by mouth 2 (two) times a day.      simethicone (MYLICON) 125 MG chewable tablet Chew 125 mg daily as needed.      WARFARIN SODIUM (WARFARIN ORAL) Take by mouth. 3 mg daily       .There were no vitals filed for this visit.  Blood pressure 117/66 pulse 80 respirations 18 temperature 97.6  Physical Exam   Constitutional: No distress.   HENT:   Head: Normocephalic.   Eyes: Pupils are equal, round, and reactive to light.   Neck: Neck supple. No thyromegaly present.   Cardiovascular:   Irregularity.  No lower extremity edema.   Pulmonary/Chest: Breath sounds normal.   Dysphagia.   Abdominal: Bowel sounds are normal. There is no tenderness. There is no guarding.   Genitourinary:   Genitourinary Comments: Urinary retention.  Barkley catheter.   Musculoskeletal:   Left hemiparesis.  History of CVA right/occipital.   Right arm embolectomy of right brachial and ulnar arteries.   Lymphadenopathy:     She has no cervical adenopathy.   Neurological: She is alert.   Dysarthria.      LABS:   Lab Results   Component Value Date    WBC 6.7 08/01/2017    HGB 12.0 08/01/2017    HCT 38.5 08/01/2017    MCV 96 08/01/2017     08/01/2017     Results for orders placed or performed in visit on 08/28/17   Basic Metabolic Panel   Result Value Ref Range    Sodium  141 136 - 145 mmol/L    Potassium 4.2 3.5 - 5.0 mmol/L    Chloride 103 98 - 107 mmol/L    CO2 28 22 - 31 mmol/L    Anion Gap, Calculation 10 5 - 18 mmol/L    Glucose 101 70 - 125 mg/dL    Calcium 9.7 8.5 - 10.5 mg/dL    BUN 19 8 - 28 mg/dL    Creatinine 0.82 0.60 - 1.10 mg/dL    GFR MDRD Af Amer >60 >60 mL/min/1.73m2    GFR MDRD Non Af Amer >60 >60 mL/min/1.73m2           ASSESSMENT:      ICD-10-CM    1. Urinary retention R33.9    2. CHF (congestive heart failure) I50.9    3. Trochanteric bursitis M70.60    4. Gouty tophi of joint [id number].00X1        PLAN:    So far the trochanteric injections were helpful.  Decrease allopurinol 100 mg once daily.  Did fail a voiding trial will keep the Barkley in and retried in the future.  Next pro time is September 19.  Last visit with vascular was August 25.  Continue with house nutrient supplements.        Electronically signed by: Michael Duane Johnson, CNP

## 2021-06-16 PROBLEM — M70.60 TROCHANTERIC BURSITIS: Status: ACTIVE | Noted: 2017-09-08

## 2021-06-16 PROBLEM — R13.10 DYSPHAGIA: Status: ACTIVE | Noted: 2017-08-04

## 2021-06-16 PROBLEM — I50.9 CHF (CONGESTIVE HEART FAILURE) (H): Status: ACTIVE | Noted: 2017-08-04

## 2021-06-16 PROBLEM — I10 HYPERTENSION: Status: ACTIVE | Noted: 2017-08-04

## 2021-06-16 PROBLEM — R33.9 URINARY RETENTION: Status: ACTIVE | Noted: 2017-08-04

## 2021-06-16 PROBLEM — K59.00 CONSTIPATION: Status: ACTIVE | Noted: 2017-08-04

## 2021-06-16 PROBLEM — R47.1 DYSARTHRIA: Status: ACTIVE | Noted: 2017-08-04

## 2021-06-16 PROBLEM — I25.10 CAD (CORONARY ARTERY DISEASE): Status: ACTIVE | Noted: 2017-08-04

## 2021-06-16 PROBLEM — M1A.9XX1 GOUTY TOPHI OF JOINT: Status: ACTIVE | Noted: 2017-08-22

## 2021-06-16 PROBLEM — G81.94 LEFT HEMIPARESIS (H): Status: ACTIVE | Noted: 2017-08-04

## 2021-06-16 PROBLEM — K21.9 GERD (GASTROESOPHAGEAL REFLUX DISEASE): Status: ACTIVE | Noted: 2017-08-04

## 2021-06-16 PROBLEM — I70.208: Status: ACTIVE | Noted: 2017-08-06

## 2021-06-16 PROBLEM — I63.9 CVA (CEREBRAL VASCULAR ACCIDENT) (H): Status: ACTIVE | Noted: 2017-08-04

## 2021-06-16 PROBLEM — I48.91 AFIB (H): Status: ACTIVE | Noted: 2017-08-04

## 2021-06-16 PROBLEM — F03.90 DEMENTIA (H): Status: ACTIVE | Noted: 2017-08-04
